# Patient Record
Sex: MALE | Race: WHITE | Employment: FULL TIME | URBAN - METROPOLITAN AREA
[De-identification: names, ages, dates, MRNs, and addresses within clinical notes are randomized per-mention and may not be internally consistent; named-entity substitution may affect disease eponyms.]

---

## 2017-09-16 ENCOUNTER — HOSPITAL ENCOUNTER (OUTPATIENT)
Dept: GENERAL RADIOLOGY | Age: 47
Discharge: HOME | End: 2017-09-16
Payer: COMMERCIAL

## 2017-09-16 DIAGNOSIS — S91.331A: ICD-10-CM

## 2017-09-16 PROCEDURE — 73630 X-RAY EXAM OF FOOT: CPT

## 2018-07-02 ENCOUNTER — OFFICE VISIT (OUTPATIENT)
Dept: FAMILY MEDICINE CLINIC | Age: 48
End: 2018-07-02
Payer: COMMERCIAL

## 2018-07-02 VITALS
RESPIRATION RATE: 12 BRPM | BODY MASS INDEX: 32.91 KG/M2 | DIASTOLIC BLOOD PRESSURE: 80 MMHG | WEIGHT: 256.44 LBS | HEIGHT: 74 IN | HEART RATE: 71 BPM | SYSTOLIC BLOOD PRESSURE: 126 MMHG | OXYGEN SATURATION: 96 % | TEMPERATURE: 97 F

## 2018-07-02 DIAGNOSIS — L02.92 BOIL: Primary | ICD-10-CM

## 2018-07-02 DIAGNOSIS — R21 RASH: ICD-10-CM

## 2018-07-02 DIAGNOSIS — B07.8 OTHER VIRAL WARTS: ICD-10-CM

## 2018-07-02 PROCEDURE — G8417 CALC BMI ABV UP PARAM F/U: HCPCS | Performed by: FAMILY MEDICINE

## 2018-07-02 PROCEDURE — 1036F TOBACCO NON-USER: CPT | Performed by: FAMILY MEDICINE

## 2018-07-02 PROCEDURE — G8427 DOCREV CUR MEDS BY ELIG CLIN: HCPCS | Performed by: FAMILY MEDICINE

## 2018-07-02 PROCEDURE — 17000 DESTRUCT PREMALG LESION: CPT | Performed by: FAMILY MEDICINE

## 2018-07-02 PROCEDURE — 99213 OFFICE O/P EST LOW 20 MIN: CPT | Performed by: FAMILY MEDICINE

## 2018-07-02 RX ORDER — CEPHALEXIN 500 MG/1
500 CAPSULE ORAL 3 TIMES DAILY
Qty: 30 CAPSULE | Refills: 0 | Status: SHIPPED | OUTPATIENT
Start: 2018-07-02 | End: 2018-07-12

## 2018-07-02 ASSESSMENT — PATIENT HEALTH QUESTIONNAIRE - PHQ9
1. LITTLE INTEREST OR PLEASURE IN DOING THINGS: 0
SUM OF ALL RESPONSES TO PHQ QUESTIONS 1-9: 0
2. FEELING DOWN, DEPRESSED OR HOPELESS: 0
SUM OF ALL RESPONSES TO PHQ9 QUESTIONS 1 & 2: 0

## 2018-07-02 NOTE — PROGRESS NOTES
Narrative    No narrative on file     History reviewed. No pertinent family history. Past Medical History:   Diagnosis Date    Anxiety     Colitis     Depression     Dermatitis     Seasonal allergies      Past Surgical History:   Procedure Laterality Date    COLONOSCOPY  12/11/15    DR [facility]    COLONOSCOPY  12/08/2017    DR ARREGUIN         REVIEW OF SYSTEMS:   Patient seen today for exam.  Denies any problems with hearing, headaches or vision. Denies any shortness of breath, chest pain, nausea or vomiting. No black stool, no blood in the stool. No heartburn. Denies any problems with constipation or diarrhea either. No dysuria type symptoms. Objective:     /80 (Site: Left Arm, Position: Sitting, Cuff Size: Large Adult)   Pulse 71   Temp 97 °F (36.1 °C) (Temporal)   Resp 12   Ht 6' 2\" (1.88 m)   Wt 256 lb 7 oz (116.3 kg)   SpO2 96%   BMI 32.92 kg/m²     Physical Exam        O:  Alert and active male in no acute distress  HEENT:  TMs clear. Pharynx neg. Nares clear, no drainage noted  Neck supple/ no adenopathy /right cheek positive boil to centers by 1 cm not fluctuant tender to touch   HEART:  RRR without murmur/ no carotid bruits  LUNGS:  Clear to auscultation bilaterally, no wheeze or rhonchi noted  THYROID: neg masses or nodularity  ABDOMEN:  Soft x4. Bowel sounds positive. No masses or organomegaly,  Negative tenderness, guarding or rebound. EXTR:  Without edema./ good pulses bilat    Neurologic exam unremarkable. DTRs in upper and lower extremities within normal limits. Full strength noted    Skin- positive verruca noted 2 areas of his hands greater than 10 lesions noted visit were cleaned and then frozen with liquid I can refreeze technique taught tolerated procedure well no palpitations       Assessment:       Diagnosis Orders   1. Boil     2.  Rash     3. Other viral warts  40438 - [place] DESTRUC PREMALIGNANT, FIRST LESION             Plan:        Orders Placed This

## 2018-11-19 ENCOUNTER — OFFICE VISIT (OUTPATIENT)
Dept: FAMILY MEDICINE CLINIC | Age: 48
End: 2018-11-19
Payer: COMMERCIAL

## 2018-11-19 VITALS
SYSTOLIC BLOOD PRESSURE: 124 MMHG | WEIGHT: 259.1 LBS | BODY MASS INDEX: 33.27 KG/M2 | TEMPERATURE: 97 F | HEART RATE: 54 BPM | RESPIRATION RATE: 14 BRPM | DIASTOLIC BLOOD PRESSURE: 76 MMHG

## 2018-11-19 DIAGNOSIS — J06.9 UPPER RESPIRATORY TRACT INFECTION, UNSPECIFIED TYPE: Primary | ICD-10-CM

## 2018-11-19 DIAGNOSIS — M79.10 MYALGIA: ICD-10-CM

## 2018-11-19 LAB
INFLUENZA A ANTIBODY: NORMAL
INFLUENZA B ANTIBODY: NORMAL

## 2018-11-19 PROCEDURE — G8427 DOCREV CUR MEDS BY ELIG CLIN: HCPCS | Performed by: FAMILY MEDICINE

## 2018-11-19 PROCEDURE — G8417 CALC BMI ABV UP PARAM F/U: HCPCS | Performed by: FAMILY MEDICINE

## 2018-11-19 PROCEDURE — 99213 OFFICE O/P EST LOW 20 MIN: CPT | Performed by: FAMILY MEDICINE

## 2018-11-19 PROCEDURE — 87804 INFLUENZA ASSAY W/OPTIC: CPT | Performed by: FAMILY MEDICINE

## 2018-11-19 PROCEDURE — G8484 FLU IMMUNIZE NO ADMIN: HCPCS | Performed by: FAMILY MEDICINE

## 2018-11-19 PROCEDURE — 1036F TOBACCO NON-USER: CPT | Performed by: FAMILY MEDICINE

## 2018-11-19 RX ORDER — AMOXICILLIN 875 MG/1
875 TABLET, COATED ORAL 2 TIMES DAILY
Qty: 20 TABLET | Refills: 0 | Status: SHIPPED | OUTPATIENT
Start: 2018-11-19 | End: 2018-11-29

## 2019-04-19 ENCOUNTER — TELEPHONE (OUTPATIENT)
Dept: FAMILY MEDICINE CLINIC | Age: 49
End: 2019-04-19

## 2023-02-05 PROBLEM — Z86.010 HISTORY OF COLON POLYPS: Status: ACTIVE | Noted: 2023-02-05

## 2023-02-05 PROBLEM — K51.90 ULCERATIVE COLITIS (MULTI): Status: ACTIVE | Noted: 2023-02-05

## 2023-02-05 PROBLEM — R73.01 IFG (IMPAIRED FASTING GLUCOSE): Status: ACTIVE | Noted: 2023-02-05

## 2023-02-05 PROBLEM — D64.9 ANEMIA: Status: ACTIVE | Noted: 2023-02-05

## 2023-02-05 PROBLEM — E55.9 VITAMIN D DEFICIENCY: Status: ACTIVE | Noted: 2023-02-05

## 2023-02-05 PROBLEM — K64.0 GRADE I HEMORRHOIDS: Status: ACTIVE | Noted: 2023-02-05

## 2023-02-05 PROBLEM — K03.81 CRACKED TOOTH: Status: ACTIVE | Noted: 2023-02-05

## 2023-02-05 PROBLEM — J98.8 RESPIRATORY INFECTION: Status: ACTIVE | Noted: 2023-02-05

## 2023-02-05 PROBLEM — Z86.16 HISTORY OF COVID-19: Status: ACTIVE | Noted: 2023-02-05

## 2023-02-05 PROBLEM — Z86.0100 HISTORY OF COLON POLYPS: Status: ACTIVE | Noted: 2023-02-05

## 2023-02-05 RX ORDER — LANOLIN ALCOHOL/MO/W.PET/CERES
1 CREAM (GRAM) TOPICAL DAILY
COMMUNITY

## 2023-02-05 RX ORDER — MERCAPTOPURINE 50 MG/1
2 TABLET ORAL DAILY
COMMUNITY

## 2023-02-05 RX ORDER — AMOXICILLIN AND CLAVULANATE POTASSIUM 875; 125 MG/1; MG/1
1 TABLET, FILM COATED ORAL EVERY 12 HOURS
COMMUNITY
End: 2023-03-24 | Stop reason: ALTCHOICE

## 2023-02-05 RX ORDER — CALCIUM CARBONATE/VITAMIN D3 600 MG-10
1 TABLET ORAL DAILY
COMMUNITY

## 2023-02-05 RX ORDER — INFLIXIMAB 100 MG/10ML
INJECTION, POWDER, LYOPHILIZED, FOR SOLUTION INTRAVENOUS
COMMUNITY

## 2023-02-05 RX ORDER — ACETAMINOPHEN 500 MG
1 TABLET ORAL DAILY
COMMUNITY
End: 2023-03-24 | Stop reason: SDUPTHER

## 2023-03-18 PROBLEM — K03.81 CRACKED TOOTH: Status: RESOLVED | Noted: 2023-02-05 | Resolved: 2023-03-18

## 2023-03-18 PROBLEM — Z00.00 ROUTINE ADULT HEALTH MAINTENANCE: Chronic | Status: ACTIVE | Noted: 2023-03-18

## 2023-03-18 PROBLEM — J98.8 RESPIRATORY INFECTION: Status: RESOLVED | Noted: 2023-02-05 | Resolved: 2023-03-18

## 2023-03-23 LAB
ALANINE AMINOTRANSFERASE (SGPT) (U/L) IN SER/PLAS: 30 U/L (ref 10–52)
ALBUMIN (G/DL) IN SER/PLAS: 4 G/DL (ref 3.4–5)
ALKALINE PHOSPHATASE (U/L) IN SER/PLAS: 50 U/L (ref 33–120)
ANION GAP IN SER/PLAS: 10 MMOL/L (ref 10–20)
APPEARANCE, URINE: CLEAR
ASPARTATE AMINOTRANSFERASE (SGOT) (U/L) IN SER/PLAS: 25 U/L (ref 9–39)
BASOPHILS (10*3/UL) IN BLOOD BY AUTOMATED COUNT: 0.05 X10E9/L (ref 0–0.1)
BASOPHILS/100 LEUKOCYTES IN BLOOD BY AUTOMATED COUNT: 0.8 % (ref 0–2)
BILIRUBIN TOTAL (MG/DL) IN SER/PLAS: 0.5 MG/DL (ref 0–1.2)
BILIRUBIN, URINE: NEGATIVE
BLOOD, URINE: NEGATIVE
CALCIDIOL (25 OH VITAMIN D3) (NG/ML) IN SER/PLAS: 29 NG/ML
CALCIUM (MG/DL) IN SER/PLAS: 8.9 MG/DL (ref 8.6–10.3)
CARBON DIOXIDE, TOTAL (MMOL/L) IN SER/PLAS: 28 MMOL/L (ref 21–32)
CHLORIDE (MMOL/L) IN SER/PLAS: 103 MMOL/L (ref 98–107)
CHOLESTEROL (MG/DL) IN SER/PLAS: 211 MG/DL (ref 0–199)
CHOLESTEROL IN HDL (MG/DL) IN SER/PLAS: 41.6 MG/DL
CHOLESTEROL/HDL RATIO: 5.1
COLOR, URINE: YELLOW
CREATININE (MG/DL) IN SER/PLAS: 0.95 MG/DL (ref 0.5–1.3)
EOSINOPHILS (10*3/UL) IN BLOOD BY AUTOMATED COUNT: 0.29 X10E9/L (ref 0–0.7)
EOSINOPHILS/100 LEUKOCYTES IN BLOOD BY AUTOMATED COUNT: 4.6 % (ref 0–6)
ERYTHROCYTE DISTRIBUTION WIDTH (RATIO) BY AUTOMATED COUNT: 12.4 % (ref 11.5–14.5)
ERYTHROCYTE MEAN CORPUSCULAR HEMOGLOBIN CONCENTRATION (G/DL) BY AUTOMATED: 34.1 G/DL (ref 32–36)
ERYTHROCYTE MEAN CORPUSCULAR VOLUME (FL) BY AUTOMATED COUNT: 91 FL (ref 80–100)
ERYTHROCYTES (10*6/UL) IN BLOOD BY AUTOMATED COUNT: 5 X10E12/L (ref 4.5–5.9)
GFR MALE: >90 ML/MIN/1.73M2
GLUCOSE (MG/DL) IN SER/PLAS: 89 MG/DL (ref 74–99)
GLUCOSE, URINE: NEGATIVE MG/DL
HEMATOCRIT (%) IN BLOOD BY AUTOMATED COUNT: 45.5 % (ref 41–52)
HEMOGLOBIN (G/DL) IN BLOOD: 15.5 G/DL (ref 13.5–17.5)
IMMATURE GRANULOCYTES/100 LEUKOCYTES IN BLOOD BY AUTOMATED COUNT: 0.2 % (ref 0–0.9)
KETONES, URINE: NEGATIVE MG/DL
LDL: 143 MG/DL (ref 0–99)
LEUKOCYTE ESTERASE, URINE: NEGATIVE
LEUKOCYTES (10*3/UL) IN BLOOD BY AUTOMATED COUNT: 6.4 X10E9/L (ref 4.4–11.3)
LYMPHOCYTES (10*3/UL) IN BLOOD BY AUTOMATED COUNT: 2.72 X10E9/L (ref 1.2–4.8)
LYMPHOCYTES/100 LEUKOCYTES IN BLOOD BY AUTOMATED COUNT: 42.8 % (ref 13–44)
MONOCYTES (10*3/UL) IN BLOOD BY AUTOMATED COUNT: 0.49 X10E9/L (ref 0.1–1)
MONOCYTES/100 LEUKOCYTES IN BLOOD BY AUTOMATED COUNT: 7.7 % (ref 2–10)
NEUTROPHILS (10*3/UL) IN BLOOD BY AUTOMATED COUNT: 2.79 X10E9/L (ref 1.2–7.7)
NEUTROPHILS/100 LEUKOCYTES IN BLOOD BY AUTOMATED COUNT: 43.9 % (ref 40–80)
NITRITE, URINE: NEGATIVE
PH, URINE: 5 (ref 5–8)
PLATELETS (10*3/UL) IN BLOOD AUTOMATED COUNT: 270 X10E9/L (ref 150–450)
POTASSIUM (MMOL/L) IN SER/PLAS: 4.2 MMOL/L (ref 3.5–5.3)
PROTEIN TOTAL: 7.6 G/DL (ref 6.4–8.2)
PROTEIN, URINE: NEGATIVE MG/DL
SODIUM (MMOL/L) IN SER/PLAS: 137 MMOL/L (ref 136–145)
SPECIFIC GRAVITY, URINE: 1.02 (ref 1–1.03)
THYROTROPIN (MIU/L) IN SER/PLAS BY DETECTION LIMIT <= 0.05 MIU/L: 2.5 MIU/L (ref 0.44–3.98)
TRIGLYCERIDE (MG/DL) IN SER/PLAS: 130 MG/DL (ref 0–149)
UREA NITROGEN (MG/DL) IN SER/PLAS: 16 MG/DL (ref 6–23)
UROBILINOGEN, URINE: <2 MG/DL (ref 0–1.9)
VLDL: 26 MG/DL (ref 0–40)

## 2023-03-24 ENCOUNTER — APPOINTMENT (OUTPATIENT)
Dept: LAB | Facility: LAB | Age: 53
End: 2023-03-24
Payer: COMMERCIAL

## 2023-03-24 ENCOUNTER — OFFICE VISIT (OUTPATIENT)
Dept: PRIMARY CARE | Facility: CLINIC | Age: 53
End: 2023-03-24
Payer: COMMERCIAL

## 2023-03-24 VITALS
WEIGHT: 270 LBS | OXYGEN SATURATION: 97 % | DIASTOLIC BLOOD PRESSURE: 81 MMHG | SYSTOLIC BLOOD PRESSURE: 127 MMHG | HEIGHT: 73 IN | HEART RATE: 63 BPM | BODY MASS INDEX: 35.78 KG/M2

## 2023-03-24 DIAGNOSIS — R73.01 IFG (IMPAIRED FASTING GLUCOSE): ICD-10-CM

## 2023-03-24 DIAGNOSIS — Z86.19 H/O HERPES ZOSTER: Primary | ICD-10-CM

## 2023-03-24 DIAGNOSIS — E66.01 CLASS 2 SEVERE OBESITY DUE TO EXCESS CALORIES WITH SERIOUS COMORBIDITY AND BODY MASS INDEX (BMI) OF 35.0 TO 35.9 IN ADULT (MULTI): ICD-10-CM

## 2023-03-24 DIAGNOSIS — Z13.6 SCREENING FOR CARDIOVASCULAR CONDITION: ICD-10-CM

## 2023-03-24 DIAGNOSIS — E78.2 HYPERLIPIDEMIA, MIXED: ICD-10-CM

## 2023-03-24 DIAGNOSIS — Z00.00 ROUTINE ADULT HEALTH MAINTENANCE: Chronic | ICD-10-CM

## 2023-03-24 DIAGNOSIS — K51.90 ULCERATIVE COLITIS WITHOUT COMPLICATIONS, UNSPECIFIED LOCATION (MULTI): ICD-10-CM

## 2023-03-24 DIAGNOSIS — E55.9 VITAMIN D DEFICIENCY: ICD-10-CM

## 2023-03-24 DIAGNOSIS — Z12.5 SCREENING FOR PROSTATE CANCER: ICD-10-CM

## 2023-03-24 PROBLEM — E66.812 CLASS 2 SEVERE OBESITY DUE TO EXCESS CALORIES WITH SERIOUS COMORBIDITY AND BODY MASS INDEX (BMI) OF 35.0 TO 35.9 IN ADULT: Status: ACTIVE | Noted: 2023-03-24

## 2023-03-24 PROCEDURE — 3008F BODY MASS INDEX DOCD: CPT | Performed by: INTERNAL MEDICINE

## 2023-03-24 PROCEDURE — 1036F TOBACCO NON-USER: CPT | Performed by: INTERNAL MEDICINE

## 2023-03-24 PROCEDURE — 99396 PREV VISIT EST AGE 40-64: CPT | Performed by: INTERNAL MEDICINE

## 2023-03-24 RX ORDER — ACETAMINOPHEN 500 MG
100 TABLET ORAL DAILY
Qty: 1 CAPSULE | Refills: 1
Start: 2023-03-24

## 2023-03-24 RX ORDER — ASCORBIC ACID 500 MG
500 TABLET ORAL 2 TIMES DAILY
COMMUNITY

## 2023-03-24 ASSESSMENT — PATIENT HEALTH QUESTIONNAIRE - PHQ9
2. FEELING DOWN, DEPRESSED OR HOPELESS: NOT AT ALL
SUM OF ALL RESPONSES TO PHQ9 QUESTIONS 1 AND 2: 0
1. LITTLE INTEREST OR PLEASURE IN DOING THINGS: NOT AT ALL

## 2023-03-24 NOTE — ASSESSMENT & PLAN NOTE
Annual Wellness exam completed   Preventive Health history reviewed:  Vaccines today: Shingles vaccine discussed  Labs ordered    Cscope ordered    Depression Screening done

## 2023-03-24 NOTE — PROGRESS NOTES
Reason for Visit: Annual Physical Exam    Assessment and Plan:  Problem List Items Addressed This Visit          High    Routine adult health maintenance (Chronic)    Overview     Declined COVID and FLU Vaccines  Cscope 2/17/21 (2yrs)  TDAP 6/28/13, 9/16/17         Current Assessment & Plan     Annual Wellness exam completed   Preventive Health history reviewed:  Vaccines today: Shingles vaccine discussed  Labs ordered    Cscope ordered    Depression Screening done         Relevant Orders    Urinalysis with Reflex Microscopic    Comprehensive Metabolic Panel    CBC and Auto Differential    Lipid Panel    Referral to Ophthalmology       Medium    IFG (impaired fasting glucose)    Overview     Diet controlled  Hba1c 5.9% in 2/22         Relevant Orders    Hemoglobin A1c    Ulcerative colitis (CMS/HCC)    Overview     Dx in 2001  in clinical remission on Remicade and 6 MP 50 mg BID  Managed By GI, Dr Seals         Current Assessment & Plan     Cscope due         Vitamin D deficiency    Overview     2/3/22: 28  on supp  Goal ~50         Current Assessment & Plan     Increase dose to 2/day         Relevant Medications    cholecalciferol (Vitamin D-3) 50 mcg (2,000 unit) capsule    Other Relevant Orders    Vitamin D, Total    H/O herpes zoster - Primary    Hyperlipidemia, mixed    Relevant Orders    CT cardiac scoring wo IV contrast    TSH with reflex to Free T4 if abnormal    Class 2 severe obesity due to excess calories with serious comorbidity and body mass index (BMI) of 35.0 to 35.9 in adult (CMS/[facility])    Overview     Discussed IF  Will change whole milk to 2%  Cut back on butter         Screening for prostate cancer    Relevant Orders    Prostate Spec.Ag,Screen    Prostate Spec.Ag,Screen     Other Visit Diagnoses       Screening for cardiovascular condition        Relevant Orders    CT cardiac scoring wo IV contrast          HPI: denies any concerns today  Lasbs reviewed:  Component      Latest Ref Rng 3/23/2023    WBC      4.4 - 11.3 x10E9/L 6.4    RBC      4.50 - 5.90 x10E12/L 5.00    HEMOGLOBIN      13.5 - 17.5 g/dL 15.5    HEMATOCRIT      41.0 - 52.0 % 45.5    MCV      80 - 100 fL 91    MCHC      32.0 - 36.0 g/dL 34.1    Platelets      150 - 450 x10E9/L 270    RED CELL DISTRIBUTION WIDTH      11.5 - 14.5 % 12.4    Neutrophils %      40.0 - 80.0 % 43.9    Immature Granulocytes %, Automated      0.0 - 0.9 % 0.2    Lymphocytes %      13.0 - 44.0 % 42.8    Monocytes %      2.0 - 10.0 % 7.7    Eosinophils %      0.0 - 6.0 % 4.6    Basophils %      0.0 - 2.0 % 0.8    Neutrophils Absolute      1.20 - 7.70 x10E9/L 2.79    Lymphocytes Absolute      1.20 - 4.80 x10E9/L 2.72    Monocytes Absolute      0.10 - 1.00 x10E9/L 0.49    Eosinophils Absolute      0.00 - 0.70 x10E9/L 0.29    Basophils Absolute      0.00 - 0.10 x10E9/L 0.05    GLUCOSE      74 - 99 mg/dL 89    SODIUM      136 - 145 mmol/L 137    POTASSIUM      3.5 - 5.3 mmol/L 4.2    CHLORIDE      98 - 107 mmol/L 103    Bicarbonate      21 - 32 mmol/L 28    Anion Gap      10 - 20 mmol/L 10    Blood Urea Nitrogen      6 - 23 mg/dL 16    Creatinine      0.50 - 1.30 mg/dL 0.95    GFR MALE      >90 mL/min/1.73m2 >90    Calcium      8.6 - 10.3 mg/dL 8.9    Albumin      3.4 - 5.0 g/dL 4.0    Alkaline Phosphatase      33 - 120 U/L 50    Total Protein      6.4 - 8.2 g/dL 7.6    AST      9 - 39 U/L 25    Bilirubin Total      0.0 - 1.2 mg/dL 0.5    ALT      10 - 52 U/L 30    Color, Urine      STRAW,YELLOW  YELLOW    Appearance, Urine      CLEAR  CLEAR    Specific Gravity, Urine      1.005 - 1.035  1.020    pH, Urine      5.0 - 8.0  5.0    Protein, Urine      NEGATIVE mg/dL NEGATIVE    Glucose, Urine      NEGATIVE mg/dL NEGATIVE    Blood, Urine      NEGATIVE  NEGATIVE    Ketones, Urine      NEGATIVE mg/dL NEGATIVE    Bilirubin, Urine      NEGATIVE  NEGATIVE    Urobilinogen, Urine      0.0 - 1.9 mg/dL <2.0    Nitrite, Urine      NEGATIVE  NEGATIVE    Leukocyte Esterase, Urine       "NEGATIVE  NEGATIVE    CHOLESTEROL      0 - 199 mg/dL 211 (H)    HDL CHOLESTEROL      mg/dL 41.6    Cholesterol/HDL Ratio 5.1 !    LDL      0 - 99 mg/dL 143 (H)    VLDL      0 - 40 mg/dL 26    TRIGLYCERIDES      0 - 149 mg/dL 130    Vitamin D, 25-Hydroxy, Total      ng/mL 29 !    Thyroid Stimulating Hormone      0.44 - 3.98 mIU/L 2.50        ROS otherwise negative aside from what was mentioned above in HPI.    Vitals  /81   Pulse 63   Ht 1.854 m (6' 1\")   Wt 122 kg (270 lb)   SpO2 97%   BMI 35.62 kg/m²   Body mass index is 35.62 kg/m².  Physical Exam  Gen: Alert, NAD  HEENT:  Normal exam  Neck:  No masses/nodes palpable; Thyroid nontender and without nodules; No SHELLI  Respiratory:  Lungs CTAB  CV: RRR  Neuro:  Gross motor and sensory intact  Skin:  No suspicious lesions present  Breast: No masses or axillary lymphadenopathy  ELBERT: Prostate not enlarged. No prostate mass or nodule(s) palpated, brown stool. (Pt declined chaperone)    Active Problem List  Patient Active Problem List   Diagnosis    Anemia    Grade I hemorrhoids    History of colon polyps    History of COVID-19    IFG (impaired fasting glucose)    Ulcerative colitis (CMS/[facility])    Vitamin D deficiency    Routine adult health maintenance    H/O herpes zoster    Hyperlipidemia, mixed    Class 2 severe obesity due to excess calories with serious comorbidity and body mass index (BMI) of 35.0 to 35.9 in adult (CMS/[facility])    Screening for prostate cancer       Comprehensive Medical/Surgical/Social/Family History  Past Medical History:   Diagnosis Date    H/O chest x-ray 01/14/2022    Some improvement in continued bilateral large subpleural infiltrates, left greater than right (COVID)    H/O CT scan of chest 12/29/2021    No CT evidence of pulmonary embolism. 2. Extensive bilateral groundglass interstitial infiltrate without interval improvement with persistent mild reactive hilar and mediastinal adenopathy.    H/O echocardiogram 01/15/2022    The left " ventricle is normal in size. Left ventricular systolic function is hyperdynamic. EF = 79 5% (2D biplane) Normal left ventricular diastolic function.  The right ventricle is normal in size. Right ventricular systolic function is normal.  Estimated right ventricular systolic pressure is not reported due to an insufficient tricuspid regurgitation signal.    H/O echocardiogram 01/15/2022    Pt 2. Estimated right atrial pressure is 3 mmHg based on IVC assessment. No significant valvular abnormalities.     Past Surgical History:   Procedure Laterality Date    COLONOSCOPY      Pseudopolyps in the ascending colon. Resected and retrieved.  Two diminutive polyps in the sigmoid colon, removed with a jumbo cold forceps. Resected and retrieved.  Four diminutive polyps in the rectum, removed with a jumbo cold forceps. Resected and retrieved.  Decreased mucosa vascular pattern in the rectum and in the sigmoid colon.  Internal hemorrhoids     Social History     Social History Narrative    1 child    [facility]    Nonsmoker    Occasional ETOH    ---    Family History:    M:    F: Cancer (agent orange related?) ( age 60)    GM: CAD, PVD ()    GM:COPD, DM ( age 97)    GF: {Prostate CA ( age 67)    B: Asthma    2B:    :    :       Allergies and Medications  Patient has no known allergies.  Current Outpatient Medications   Medication Instructions    ascorbic acid (VITAMIN C) 500 mg, oral, 2 times daily    cholecalciferol (VITAMIN D-3) 100 mcg, oral, Daily    cyanocobalamin (Vitamin B-12) 1,000 mcg tablet 1 tablet, oral, Daily    inFLIXimab (Remicade) 100 mg injection intravenous, Use as directed    L. rhamnosus/C/zinc/elderberry (CULTURELLE IMMUNE DEFENSE ORAL) 2 tablets, oral, Daily    Lactobacillus acidophilus (PROBIOTIC ORAL) 1 capsule, oral, Daily    mercaptopurine (Purinethol) 50 mg tablet 2 tablets, oral, Daily    NON FORMULARY Daily, Wheat Grass    omega-3 fatty acids-fish oil (One-Per-Day  Omega-3) 684-1,200 mg capsule 2 capsules, oral, Daily    TURMERIC ORAL 1 capsule, oral, Daily, Turmeric Curcumin Oral Capsule

## 2023-04-03 PROBLEM — R93.1 ABNORMAL SCREENING CARDIAC CT: Status: ACTIVE | Noted: 2023-04-03

## 2023-05-09 ENCOUNTER — LAB (OUTPATIENT)
Dept: LAB | Facility: LAB | Age: 53
End: 2023-05-09
Payer: COMMERCIAL

## 2023-05-09 DIAGNOSIS — E55.9 VITAMIN D DEFICIENCY: ICD-10-CM

## 2023-05-09 DIAGNOSIS — Z00.00 ROUTINE ADULT HEALTH MAINTENANCE: Chronic | ICD-10-CM

## 2023-05-09 DIAGNOSIS — Z12.5 SCREENING FOR PROSTATE CANCER: ICD-10-CM

## 2023-05-09 DIAGNOSIS — E78.2 HYPERLIPIDEMIA, MIXED: ICD-10-CM

## 2023-05-09 DIAGNOSIS — R73.01 IFG (IMPAIRED FASTING GLUCOSE): ICD-10-CM

## 2023-05-09 LAB
ALANINE AMINOTRANSFERASE (SGPT) (U/L) IN SER/PLAS: 19 U/L (ref 10–52)
ALBUMIN (G/DL) IN SER/PLAS: 4 G/DL (ref 3.4–5)
ALKALINE PHOSPHATASE (U/L) IN SER/PLAS: 49 U/L (ref 33–120)
ANION GAP IN SER/PLAS: 10 MMOL/L (ref 10–20)
APPEARANCE, URINE: CLEAR
ASPARTATE AMINOTRANSFERASE (SGOT) (U/L) IN SER/PLAS: 17 U/L (ref 9–39)
BASOPHILS (10*3/UL) IN BLOOD BY AUTOMATED COUNT: 0.04 X10E9/L (ref 0–0.1)
BASOPHILS/100 LEUKOCYTES IN BLOOD BY AUTOMATED COUNT: 0.7 % (ref 0–2)
BILIRUBIN TOTAL (MG/DL) IN SER/PLAS: 0.5 MG/DL (ref 0–1.2)
BILIRUBIN, URINE: NEGATIVE
BLOOD, URINE: NEGATIVE
CALCIDIOL (25 OH VITAMIN D3) (NG/ML) IN SER/PLAS: 32 NG/ML
CALCIUM (MG/DL) IN SER/PLAS: 8.6 MG/DL (ref 8.6–10.3)
CARBON DIOXIDE, TOTAL (MMOL/L) IN SER/PLAS: 28 MMOL/L (ref 21–32)
CHLORIDE (MMOL/L) IN SER/PLAS: 105 MMOL/L (ref 98–107)
CHOLESTEROL (MG/DL) IN SER/PLAS: 206 MG/DL (ref 0–199)
CHOLESTEROL IN HDL (MG/DL) IN SER/PLAS: 39.3 MG/DL
CHOLESTEROL/HDL RATIO: 5.2
COLOR, URINE: YELLOW
CREATININE (MG/DL) IN SER/PLAS: 1.1 MG/DL (ref 0.5–1.3)
EOSINOPHILS (10*3/UL) IN BLOOD BY AUTOMATED COUNT: 0.27 X10E9/L (ref 0–0.7)
EOSINOPHILS/100 LEUKOCYTES IN BLOOD BY AUTOMATED COUNT: 4.9 % (ref 0–6)
ERYTHROCYTE DISTRIBUTION WIDTH (RATIO) BY AUTOMATED COUNT: 12 % (ref 11.5–14.5)
ERYTHROCYTE MEAN CORPUSCULAR HEMOGLOBIN CONCENTRATION (G/DL) BY AUTOMATED: 33.9 G/DL (ref 32–36)
ERYTHROCYTE MEAN CORPUSCULAR VOLUME (FL) BY AUTOMATED COUNT: 90 FL (ref 80–100)
ERYTHROCYTES (10*6/UL) IN BLOOD BY AUTOMATED COUNT: 4.88 X10E12/L (ref 4.5–5.9)
ESTIMATED AVERAGE GLUCOSE FOR HBA1C: 103 MG/DL
GFR MALE: 82 ML/MIN/1.73M2
GLUCOSE (MG/DL) IN SER/PLAS: 93 MG/DL (ref 74–99)
GLUCOSE, URINE: NEGATIVE MG/DL
HEMATOCRIT (%) IN BLOOD BY AUTOMATED COUNT: 43.9 % (ref 41–52)
HEMOGLOBIN (G/DL) IN BLOOD: 14.9 G/DL (ref 13.5–17.5)
HEMOGLOBIN A1C/HEMOGLOBIN TOTAL IN BLOOD: 5.2 %
IMMATURE GRANULOCYTES/100 LEUKOCYTES IN BLOOD BY AUTOMATED COUNT: 0.2 % (ref 0–0.9)
KETONES, URINE: NEGATIVE MG/DL
LDL: 138 MG/DL (ref 0–99)
LEUKOCYTE ESTERASE, URINE: NEGATIVE
LEUKOCYTES (10*3/UL) IN BLOOD BY AUTOMATED COUNT: 5.6 X10E9/L (ref 4.4–11.3)
LYMPHOCYTES (10*3/UL) IN BLOOD BY AUTOMATED COUNT: 2.52 X10E9/L (ref 1.2–4.8)
LYMPHOCYTES/100 LEUKOCYTES IN BLOOD BY AUTOMATED COUNT: 45.3 % (ref 13–44)
MONOCYTES (10*3/UL) IN BLOOD BY AUTOMATED COUNT: 0.48 X10E9/L (ref 0.1–1)
MONOCYTES/100 LEUKOCYTES IN BLOOD BY AUTOMATED COUNT: 8.6 % (ref 2–10)
NEUTROPHILS (10*3/UL) IN BLOOD BY AUTOMATED COUNT: 2.24 X10E9/L (ref 1.2–7.7)
NEUTROPHILS/100 LEUKOCYTES IN BLOOD BY AUTOMATED COUNT: 40.3 % (ref 40–80)
NITRITE, URINE: NEGATIVE
PH, URINE: 5 (ref 5–8)
PLATELETS (10*3/UL) IN BLOOD AUTOMATED COUNT: 260 X10E9/L (ref 150–450)
POTASSIUM (MMOL/L) IN SER/PLAS: 4.2 MMOL/L (ref 3.5–5.3)
PROSTATE SPECIFIC ANTIGEN,SCREEN: 0.93 NG/ML (ref 0–4)
PROTEIN TOTAL: 7.1 G/DL (ref 6.4–8.2)
PROTEIN, URINE: NEGATIVE MG/DL
SODIUM (MMOL/L) IN SER/PLAS: 139 MMOL/L (ref 136–145)
SPECIFIC GRAVITY, URINE: 1.02 (ref 1–1.03)
THYROTROPIN (MIU/L) IN SER/PLAS BY DETECTION LIMIT <= 0.05 MIU/L: 2.53 MIU/L (ref 0.44–3.98)
TRIGLYCERIDE (MG/DL) IN SER/PLAS: 142 MG/DL (ref 0–149)
UREA NITROGEN (MG/DL) IN SER/PLAS: 13 MG/DL (ref 6–23)
UROBILINOGEN, URINE: <2 MG/DL (ref 0–1.9)
VLDL: 28 MG/DL (ref 0–40)

## 2023-05-09 PROCEDURE — 36415 COLL VENOUS BLD VENIPUNCTURE: CPT

## 2023-05-09 PROCEDURE — 82306 VITAMIN D 25 HYDROXY: CPT

## 2023-05-09 PROCEDURE — 80053 COMPREHEN METABOLIC PANEL: CPT

## 2023-05-09 PROCEDURE — 85025 COMPLETE CBC W/AUTO DIFF WBC: CPT

## 2023-05-09 PROCEDURE — 80061 LIPID PANEL: CPT

## 2023-05-09 PROCEDURE — 83036 HEMOGLOBIN GLYCOSYLATED A1C: CPT

## 2023-05-09 PROCEDURE — 81003 URINALYSIS AUTO W/O SCOPE: CPT

## 2023-05-09 PROCEDURE — 84153 ASSAY OF PSA TOTAL: CPT

## 2023-05-09 PROCEDURE — 84443 ASSAY THYROID STIM HORMONE: CPT

## 2023-12-18 ENCOUNTER — TELEPHONE (OUTPATIENT)
Dept: PRIMARY CARE | Facility: CLINIC | Age: 53
End: 2023-12-18

## 2023-12-18 ENCOUNTER — TELEMEDICINE (OUTPATIENT)
Dept: PRIMARY CARE | Facility: CLINIC | Age: 53
End: 2023-12-18
Payer: COMMERCIAL

## 2023-12-18 DIAGNOSIS — M54.12 CERVICAL RADICULOPATHY: Primary | ICD-10-CM

## 2023-12-18 PROCEDURE — 99213 OFFICE O/P EST LOW 20 MIN: CPT | Performed by: INTERNAL MEDICINE

## 2023-12-18 RX ORDER — METHYLPREDNISOLONE 4 MG/1
TABLET ORAL
Qty: 21 TABLET | Refills: 0 | Status: SHIPPED | OUTPATIENT
Start: 2023-12-18 | End: 2023-12-25

## 2023-12-18 ASSESSMENT — PATIENT HEALTH QUESTIONNAIRE - PHQ9
2. FEELING DOWN, DEPRESSED OR HOPELESS: NOT AT ALL
1. LITTLE INTEREST OR PLEASURE IN DOING THINGS: NOT AT ALL
SUM OF ALL RESPONSES TO PHQ9 QUESTIONS 1 AND 2: 0

## 2023-12-18 NOTE — TELEPHONE ENCOUNTER
Pt called had a kink in his neck yesterday saw Dr Laws today he took xrays and sounded like he used a tens unit on him he is having shooting pain down the neck through the right tricep 9-10 pain spoke to his massage therapist they suggested asking pcp for a steroid to calm it down  Please advise

## 2023-12-18 NOTE — PROGRESS NOTES
An interactive telecommunication system which permits real time communications between the patient (at the originating site) and provider (at the distant site) was utilized to provide this telehealth service.    Verbal consent was requested and obtained from the patient for this telehealth visit.    We have confirmed the patient wishes to see me, Dr. Michelle Michaud, a board certified Internal Medicine physician with an active [place] medical license as well as verified the patient's identity and physical location in [place].  Audio and Visual both.    CC/HPI:   C/o Neck pain, asking for steroids  Woke up with kink in neck Sunday AM   Went to Dr Veto newton today had xrays done and tens unit  Seeing him tomorrow also to review results  Shooting pain fron neck down to right tricep  Worse and worse all day  Using NSAIDs w/o help  Right side and radiating into his right arm, tricep region  Didn't sleep due to the pain,So much pain, tears in eyes at times  No injruy and no trauma, no unusual activities  New pillow 2months ago and no issues until now  No rash  Hx shingles on face left side in past    Active Problem List  Patient Active Problem List   Diagnosis    Anemia    Grade I hemorrhoids    History of colon polyps    History of COVID-19    IFG (impaired fasting glucose)    Ulcerative colitis (CMS/[facility])    Vitamin D deficiency    Routine adult health maintenance    H/O herpes zoster    Hyperlipidemia, mixed    Class 2 severe obesity due to excess calories with serious comorbidity and body mass index (BMI) of 35.0 to 35.9 in adult (CMS/[facility])    Screening for prostate cancer    Abnormal screening cardiac CT       Allergies and Medications  Patient has no known allergies.  Current Outpatient Medications   Medication Instructions    ascorbic acid (VITAMIN C) 500 mg, oral, 2 times daily    cholecalciferol (VITAMIN D-3) 100 mcg, oral, Daily    cyanocobalamin (Vitamin B-12) 1,000 mcg tablet 1 tablet, oral, Daily    inFLIXimab  (Remicade) 100 mg injection intravenous, Use as directed    L. rhamnosus/C/zinc/elderberry (CULTURELLE IMMUNE DEFENSE ORAL) 2 tablets, oral, Daily    Lactobacillus acidophilus (PROBIOTIC ORAL) 1 capsule, oral, Daily    mercaptopurine (Purinethol) 50 mg tablet 2 tablets, oral, Daily    methylPREDNISolone (Medrol Dospak) 4 mg tablets Take as directed on package.    NON FORMULARY Daily, Wheat Grass    omega-3 fatty acids-fish oil (One-Per-Day Omega-3) 684-1,200 mg capsule 1 capsule, oral, Daily    TURMERIC ORAL 3 capsules, oral, Daily, Turmeric Curcumin Oral Capsule       ROS otherwise negative aside from what was mentioned above in HPI.    Vitals  Physical Exam  Gen: Alert, in mild distress with pain        Assessment and Plan:  Problem List Items Addressed This Visit    None  Visit Diagnoses       Cervical radiculopathy    -  Primary    Await xray results from Chiro  Empiric medrol and Percocet for pain  If sx persist will get MRI cervical spine and try Gabpentin    Relevant Medications    methylPREDNISolone (Medrol Dospak) 4 mg tablets    Other Relevant Orders    MR cervical spine wo IV contrast

## 2023-12-19 RX ORDER — OXYCODONE AND ACETAMINOPHEN 5; 325 MG/1; MG/1
1 TABLET ORAL EVERY 6 HOURS PRN
Qty: 28 TABLET | Refills: 0 | Status: SHIPPED | OUTPATIENT
Start: 2023-12-19 | End: 2023-12-22 | Stop reason: SDUPTHER

## 2023-12-19 NOTE — TELEPHONE ENCOUNTER
Pt calling states pain meds weren't called in yesterday.     Reviewed note looks like should be percocet? Please advise

## 2023-12-19 NOTE — TELEPHONE ENCOUNTER
Relayed to patient   Verbally understands   Patient states he just left the chiropractor  He has pinched nerve 4-6 disc   Right arm was getting tingling and numb ( FYI )     Appreciates that you sent in rx!

## 2023-12-21 NOTE — TELEPHONE ENCOUNTER
Spoke with Pt  Pt stated he is unable to get into to the MRI at  facility until 1/6. Pt is calling [facility] to see if they can get him in earlier  Please advise

## 2023-12-22 ENCOUNTER — OFFICE VISIT (OUTPATIENT)
Dept: PRIMARY CARE | Facility: CLINIC | Age: 53
End: 2023-12-22
Payer: COMMERCIAL

## 2023-12-22 VITALS
SYSTOLIC BLOOD PRESSURE: 149 MMHG | BODY MASS INDEX: 34.96 KG/M2 | HEART RATE: 53 BPM | DIASTOLIC BLOOD PRESSURE: 89 MMHG | WEIGHT: 265 LBS

## 2023-12-22 DIAGNOSIS — R29.2: ICD-10-CM

## 2023-12-22 DIAGNOSIS — R29.898 RIGHT ARM WEAKNESS: ICD-10-CM

## 2023-12-22 DIAGNOSIS — M54.12 CERVICAL RADICULOPATHY: Primary | ICD-10-CM

## 2023-12-22 DIAGNOSIS — K51.819 OTHER ULCERATIVE COLITIS WITH COMPLICATION (MULTI): ICD-10-CM

## 2023-12-22 PROCEDURE — 3008F BODY MASS INDEX DOCD: CPT | Performed by: INTERNAL MEDICINE

## 2023-12-22 PROCEDURE — 1036F TOBACCO NON-USER: CPT | Performed by: INTERNAL MEDICINE

## 2023-12-22 PROCEDURE — 99214 OFFICE O/P EST MOD 30 MIN: CPT | Performed by: INTERNAL MEDICINE

## 2023-12-22 RX ORDER — OXYCODONE AND ACETAMINOPHEN 10; 325 MG/1; MG/1
1 TABLET ORAL EVERY 6 HOURS PRN
Qty: 28 TABLET | Refills: 0 | Status: SHIPPED | OUTPATIENT
Start: 2023-12-22 | End: 2023-12-29

## 2023-12-22 RX ORDER — GABAPENTIN 300 MG/1
300 CAPSULE ORAL 3 TIMES DAILY
Qty: 90 CAPSULE | Refills: 3 | Status: SHIPPED | OUTPATIENT
Start: 2023-12-22 | End: 2024-04-30 | Stop reason: SINTOL

## 2023-12-22 RX ORDER — IBUPROFEN 600 MG/1
600 TABLET ORAL EVERY 6 HOURS PRN
Qty: 90 TABLET | Refills: 0 | Status: SHIPPED | OUTPATIENT
Start: 2023-12-22 | End: 2024-12-21

## 2023-12-22 NOTE — TELEPHONE ENCOUNTER
Does he want to come in for appt so I can examine him and come up with plan  We have 130 appt (NB's 2nd  slot, he came early for us today) so can use that

## 2023-12-22 NOTE — PROGRESS NOTES
Chief Complaint   Patient presents with    Arm Pain     Pain started last , woke up with it and came out of nowhere  Pain in arm radiates down right   Pins and needles feeling also  Percocet not helpin pill as needed, no relief at all. Didnt try 2pills  No AE with it  Medrol didn't help at all either (day 5 now)  4-6th vertebrae is pinched or gap isnt what it should be  Saw chiro x 3 times total  Cant sleep due to the pain  Saw PT yesterday, said was C7 nerve  Feels weakness in the right arm,        ASSESSMENT/PLAN  Problem List Items Addressed This Visit          Medium    Ulcerative colitis (CMS/HCC)    Overview     Dx in   in clinical remission on Remicade and 6 MP 50 mg BID  Managed By GI, Dr Seals         Current Assessment & Plan     Careful with NSAIDS          Other Visit Diagnoses       Cervical radiculopathy    -  Primary    XRAY reviewed, narrowed disc space C6/7  Will get MRI given neurological signs  Add Gabapentin  Increase Opiate dose  NSAID after Medrol  SPine consult    Relevant Medications    oxyCODONE-acetaminophen (Percocet)  mg tablet    gabapentin (Neurontin) 300 mg capsule    ibuprofen 600 mg tablet    Other Relevant Orders    Referral to Physical Therapy    Referral to Medical Spine    Right arm weakness        Absence of reflex                  ALLERGIES  Patient has no known allergies.    MEDICATIONS  Current Outpatient Medications   Medication Instructions    ascorbic acid (VITAMIN C) 500 mg, oral, 2 times daily    cholecalciferol (VITAMIN D-3) 100 mcg, oral, Daily    cyanocobalamin (Vitamin B-12) 1,000 mcg tablet 1 tablet, oral, Daily    gabapentin (NEURONTIN) 300 mg, oral, 3 times daily    ibuprofen 600 mg, oral, Every 6 hours PRN    inFLIXimab (Remicade) 100 mg injection intravenous, Use as directed    Lactobacillus acidophilus (PROBIOTIC ORAL) 1 capsule, oral, Daily    mercaptopurine (Purinethol) 50 mg tablet 2 tablets, oral, Daily    methylPREDNISolone (Medrol  Dospak) 4 mg tablets Take as directed on package.    NON FORMULARY Daily, Wheat Grass    omega-3 fatty acids-fish oil (One-Per-Day Omega-3) 684-1,200 mg capsule 1 capsule, oral, Daily    oxyCODONE-acetaminophen (Percocet)  mg tablet 1 tablet, oral, Every 6 hours PRN    TURMERIC ORAL 3 capsules, oral, Daily, Turmeric Curcumin Oral Capsule        ROS otherwise negative aside from what was mentioned above in HPI.    PHYSICAL EXAM  /89   Pulse 53   Wt 120 kg (265 lb)   BMI 34.96 kg/m²   Body mass index is 34.96 kg/m².  Gen: Alert, NAD  HEENT:  PERRLA, EOMI, conjunctiva and sclera normal in appearance  Neck: Pain with lfexion and rotation of neck to the right   Respiratory:  Lungs CTAB  Cardiovascular:  Heart RRR. No M/R/G  Neuro:  Slightly decreased tricep strength, absent reflex  Skin:  No suspicious lesions present  Ext: RUE FROM

## 2023-12-22 NOTE — TELEPHONE ENCOUNTER
Patient calling back   Saw Chiro today   Pain is still bad and meds aren't helping  Hasn't slept in 3 days because pain in arm is so bad   Not sure what to do or if any other meds can be called in   Re-evaluate?    [phone]

## 2024-01-04 ENCOUNTER — ANCILLARY PROCEDURE (OUTPATIENT)
Dept: RADIOLOGY | Facility: CLINIC | Age: 54
End: 2024-01-04
Payer: COMMERCIAL

## 2024-01-04 DIAGNOSIS — M50.20 CERVICAL DISC HERNIATION: Primary | ICD-10-CM

## 2024-01-04 DIAGNOSIS — M54.12 CERVICAL RADICULOPATHY: ICD-10-CM

## 2024-01-04 PROBLEM — M50.90 CERVICAL DISC DISEASE: Status: ACTIVE | Noted: 2024-01-04

## 2024-01-04 PROCEDURE — 72141 MRI NECK SPINE W/O DYE: CPT | Performed by: RADIOLOGY

## 2024-01-04 PROCEDURE — 72141 MRI NECK SPINE W/O DYE: CPT

## 2024-01-06 ENCOUNTER — APPOINTMENT (OUTPATIENT)
Dept: RADIOLOGY | Facility: CLINIC | Age: 54
End: 2024-01-06
Payer: COMMERCIAL

## 2024-01-17 ENCOUNTER — HOSPITAL ENCOUNTER (EMERGENCY)
Age: 54
Discharge: HOME | End: 2024-01-17
Attending: EMERGENCY MEDICINE
Payer: COMMERCIAL

## 2024-01-17 ENCOUNTER — APPOINTMENT (OUTPATIENT)
Dept: CT IMAGING | Age: 54
End: 2024-01-17
Payer: COMMERCIAL

## 2024-01-17 ENCOUNTER — TELEPHONE (OUTPATIENT)
Dept: PRIMARY CARE | Facility: CLINIC | Age: 54
End: 2024-01-17
Payer: COMMERCIAL

## 2024-01-17 VITALS
BODY MASS INDEX: 32.87 KG/M2 | WEIGHT: 256 LBS | SYSTOLIC BLOOD PRESSURE: 137 MMHG | HEART RATE: 66 BPM | DIASTOLIC BLOOD PRESSURE: 78 MMHG | OXYGEN SATURATION: 94 % | TEMPERATURE: 97.2 F | RESPIRATION RATE: 18 BRPM

## 2024-01-17 DIAGNOSIS — J18.9 COMMUNITY ACQUIRED BILATERAL LOWER LOBE PNEUMONIA: Primary | ICD-10-CM

## 2024-01-17 DIAGNOSIS — J18.9 PNEUMONIA DUE TO INFECTIOUS ORGANISM, UNSPECIFIED LATERALITY, UNSPECIFIED PART OF LUNG: ICD-10-CM

## 2024-01-17 LAB
ALBUMIN SERPL-MCNC: 4 G/DL (ref 3.5–4.6)
ALP SERPL-CCNC: 65 U/L (ref 35–104)
ALT SERPL-CCNC: 18 U/L (ref 0–41)
ANION GAP SERPL CALCULATED.3IONS-SCNC: 9 MEQ/L (ref 9–15)
AST SERPL-CCNC: 19 U/L (ref 0–40)
B PARAP IS1001 DNA NPH QL NAA+NON-PROBE: NOT DETECTED
B PERT.PT PRMT NPH QL NAA+NON-PROBE: NOT DETECTED
BASOPHILS # BLD: 0 K/UL (ref 0–0.2)
BASOPHILS NFR BLD: 0.3 %
BILIRUB SERPL-MCNC: <0.2 MG/DL (ref 0.2–0.7)
BUN SERPL-MCNC: 20 MG/DL (ref 6–20)
C PNEUM DNA NPH QL NAA+NON-PROBE: NOT DETECTED
CALCIUM SERPL-MCNC: 9.1 MG/DL (ref 8.5–9.9)
CHLORIDE SERPL-SCNC: 100 MEQ/L (ref 95–107)
CO2 SERPL-SCNC: 31 MEQ/L (ref 20–31)
CREAT SERPL-MCNC: 0.98 MG/DL (ref 0.7–1.2)
EOSINOPHIL # BLD: 0.2 K/UL (ref 0–0.7)
EOSINOPHIL NFR BLD: 2.6 %
ERYTHROCYTE [DISTWIDTH] IN BLOOD BY AUTOMATED COUNT: 11.8 % (ref 11.5–14.5)
FLUAV RNA NPH QL NAA+NON-PROBE: NOT DETECTED
FLUBV RNA NPH QL NAA+NON-PROBE: NOT DETECTED
GLOBULIN SER CALC-MCNC: 3.6 G/DL (ref 2.3–3.5)
GLUCOSE SERPL-MCNC: 122 MG/DL (ref 70–99)
HADV DNA NPH QL NAA+NON-PROBE: NOT DETECTED
HCOV 229E RNA NPH QL NAA+NON-PROBE: NOT DETECTED
HCOV HKU1 RNA NPH QL NAA+NON-PROBE: NOT DETECTED
HCOV NL63 RNA NPH QL NAA+NON-PROBE: NOT DETECTED
HCOV OC43 RNA NPH QL NAA+NON-PROBE: NOT DETECTED
HCT VFR BLD AUTO: 45 % (ref 42–52)
HGB BLD-MCNC: 14.9 G/DL (ref 14–18)
HMPV RNA NPH QL NAA+NON-PROBE: NOT DETECTED
HPIV1 RNA NPH QL NAA+NON-PROBE: NOT DETECTED
HPIV2 RNA NPH QL NAA+NON-PROBE: NOT DETECTED
HPIV3 RNA NPH QL NAA+NON-PROBE: DETECTED
HPIV4 RNA NPH QL NAA+NON-PROBE: NOT DETECTED
LYMPHOCYTES # BLD: 3.6 K/UL (ref 1–4.8)
LYMPHOCYTES NFR BLD: 51.6 %
M PNEUMO DNA NPH QL NAA+NON-PROBE: NOT DETECTED
MCH RBC QN AUTO: 30.1 PG (ref 27–31.3)
MCHC RBC AUTO-ENTMCNC: 33.1 % (ref 33–37)
MCV RBC AUTO: 90.9 FL (ref 79–92.2)
MONOCYTES # BLD: 0.3 K/UL (ref 0.2–0.8)
MONOCYTES NFR BLD: 4.3 %
NEUTROPHILS # BLD: 2.8 K/UL (ref 1.4–6.5)
NEUTS SEG NFR BLD: 40.6 %
PERFORMED ON: NORMAL
PLATELET # BLD AUTO: 289 K/UL (ref 130–400)
POC CREATININE WHOLE BLOOD: 1.1
POC CREATININE: 1.1 MG/DL (ref 0.8–1.3)
POC SAMPLE TYPE: NORMAL
POTASSIUM SERPL-SCNC: 3.6 MEQ/L (ref 3.4–4.9)
PROT SERPL-MCNC: 7.6 G/DL (ref 6.3–8)
RBC # BLD AUTO: 4.95 M/UL (ref 4.7–6.1)
RSV RNA NPH QL NAA+NON-PROBE: NOT DETECTED
RV+EV RNA NPH QL NAA+NON-PROBE: NOT DETECTED
SARS-COV-2 RDRP RESP QL NAA+PROBE: NOT DETECTED
SARS-COV-2 RNA NPH QL NAA+NON-PROBE: NOT DETECTED
SODIUM SERPL-SCNC: 140 MEQ/L (ref 135–144)
TROPONIN, HIGH SENSITIVITY: 7 NG/L (ref 0–19)
WBC # BLD AUTO: 6.9 K/UL (ref 4.8–10.8)

## 2024-01-17 PROCEDURE — 96365 THER/PROPH/DIAG IV INF INIT: CPT

## 2024-01-17 PROCEDURE — 96361 HYDRATE IV INFUSION ADD-ON: CPT

## 2024-01-17 PROCEDURE — 71275 CT ANGIOGRAPHY CHEST: CPT

## 2024-01-17 PROCEDURE — 2580000003 HC RX 258: Performed by: EMERGENCY MEDICINE

## 2024-01-17 PROCEDURE — 6360000002 HC RX W HCPCS: Performed by: EMERGENCY MEDICINE

## 2024-01-17 PROCEDURE — 99285 EMERGENCY DEPT VISIT HI MDM: CPT

## 2024-01-17 PROCEDURE — 6360000004 HC RX CONTRAST MEDICATION: Performed by: EMERGENCY MEDICINE

## 2024-01-17 PROCEDURE — 80053 COMPREHEN METABOLIC PANEL: CPT

## 2024-01-17 PROCEDURE — 0202U NFCT DS 22 TRGT SARS-COV-2: CPT

## 2024-01-17 PROCEDURE — 87635 SARS-COV-2 COVID-19 AMP PRB: CPT

## 2024-01-17 PROCEDURE — 87040 BLOOD CULTURE FOR BACTERIA: CPT

## 2024-01-17 PROCEDURE — 85025 COMPLETE CBC W/AUTO DIFF WBC: CPT

## 2024-01-17 PROCEDURE — 84484 ASSAY OF TROPONIN QUANT: CPT

## 2024-01-17 PROCEDURE — 36415 COLL VENOUS BLD VENIPUNCTURE: CPT

## 2024-01-17 RX ORDER — 0.9 % SODIUM CHLORIDE 0.9 %
1000 INTRAVENOUS SOLUTION INTRAVENOUS ONCE
Status: COMPLETED | OUTPATIENT
Start: 2024-01-17 | End: 2024-01-17

## 2024-01-17 RX ORDER — AZITHROMYCIN 250 MG/1
TABLET, FILM COATED ORAL
Qty: 6 TABLET | Refills: 0 | Status: SHIPPED | OUTPATIENT
Start: 2024-01-17 | End: 2024-01-27

## 2024-01-17 RX ADMIN — CEFTRIAXONE SODIUM 1000 MG: 1 INJECTION, POWDER, FOR SOLUTION INTRAMUSCULAR; INTRAVENOUS at 03:16

## 2024-01-17 RX ADMIN — SODIUM CHLORIDE 1000 ML: 9 INJECTION, SOLUTION INTRAVENOUS at 00:51

## 2024-01-17 RX ADMIN — IOPAMIDOL 75 ML: 612 INJECTION, SOLUTION INTRAVENOUS at 02:16

## 2024-01-17 ASSESSMENT — ENCOUNTER SYMPTOMS
ABDOMINAL PAIN: 0
COUGH: 0
ABDOMINAL DISTENTION: 0
VOMITING: 0
WHEEZING: 0
EYE DISCHARGE: 0
SHORTNESS OF BREATH: 0
SORE THROAT: 0
PHOTOPHOBIA: 0
CHEST TIGHTNESS: 0

## 2024-01-17 ASSESSMENT — LIFESTYLE VARIABLES
HOW OFTEN DO YOU HAVE A DRINK CONTAINING ALCOHOL: NEVER
HOW MANY STANDARD DRINKS CONTAINING ALCOHOL DO YOU HAVE ON A TYPICAL DAY: PATIENT DOES NOT DRINK

## 2024-01-17 ASSESSMENT — PAIN - FUNCTIONAL ASSESSMENT: PAIN_FUNCTIONAL_ASSESSMENT: NONE - DENIES PAIN

## 2024-01-17 NOTE — TELEPHONE ENCOUNTER
----- Message from Michelle Michaud MD sent at 1/17/2024  8:10 AM EST -----  S/p ER visit yesterday  See how he is doing today  Can review in CE    Can ask him if he wants to try gabapentin at lower dose 100mg if was helping his cervical radiculopathy at all, I added it to allergy list already  (300mg caused LH/dizziness, was reason for ER visit), send if wishes.   Can take just at night at first, body often gets used to med after a few days and symptoms subside  Also would order repeat CT chest w/o contrast in 6 weeks given unusual finding of 'pneumonia' - read ER MD note  Can do ER f/up visit if wishes/need be  Diagnosis:  pneumonia

## 2024-01-17 NOTE — ED PROVIDER NOTES
dictations but occasionally words are mis-transcribed.)    Edinson Tam MD (electronically signed)  Attending Emergency Physician           Edinson Tam MD  01/17/24 [id number]     No assistance needed

## 2024-01-17 NOTE — TELEPHONE ENCOUNTER
Relayed to patient   Verbally understands.   Hesitant to take any gabapentin   Seeing Dr. Gaspar tomorrow about pinched nerve  Advised to repeat 6 week CT chest ,ordered

## 2024-01-17 NOTE — ED NOTES
The following labs were labeled with appropriate pt sticker and tubed to lab:     [] Blue     [] Lavender   [] on ice  [] Green/yellow  [] Green/black [] on ice  [] Grey  [] on ice  [] Yellow  [] Red  [] Pink  [] Type/ Screen  [] ABG  [] VBG    [x] COVID-19 swab    [x] Rapid  [] PCR  [x] Flu swab  [] Peds Viral Panel     [] Urine Sample  [] Fecal Sample  [] Pelvic Cultures  [x] Blood Cultures  [x] X 2  [] STREP Cultures  [] Wound Cultures

## 2024-01-18 ENCOUNTER — OFFICE VISIT (OUTPATIENT)
Dept: ORTHOPEDIC SURGERY | Facility: CLINIC | Age: 54
End: 2024-01-18
Payer: COMMERCIAL

## 2024-01-18 ENCOUNTER — ANCILLARY PROCEDURE (OUTPATIENT)
Dept: RADIOLOGY | Facility: CLINIC | Age: 54
End: 2024-01-18
Payer: COMMERCIAL

## 2024-01-18 VITALS — HEIGHT: 74 IN | BODY MASS INDEX: 34.01 KG/M2 | WEIGHT: 265 LBS

## 2024-01-18 DIAGNOSIS — M54.2 NECK PAIN: ICD-10-CM

## 2024-01-18 DIAGNOSIS — M54.12 CERVICAL RADICULOPATHY: Primary | ICD-10-CM

## 2024-01-18 LAB
BACTERIA BLD CULT ORG #2: NORMAL
BACTERIA BLD CULT: NORMAL

## 2024-01-18 PROCEDURE — 72050 X-RAY EXAM NECK SPINE 4/5VWS: CPT

## 2024-01-18 PROCEDURE — 99215 OFFICE O/P EST HI 40 MIN: CPT | Performed by: ORTHOPAEDIC SURGERY

## 2024-01-18 PROCEDURE — 99205 OFFICE O/P NEW HI 60 MIN: CPT | Performed by: ORTHOPAEDIC SURGERY

## 2024-01-18 PROCEDURE — 1036F TOBACCO NON-USER: CPT | Performed by: ORTHOPAEDIC SURGERY

## 2024-01-18 PROCEDURE — 72050 X-RAY EXAM NECK SPINE 4/5VWS: CPT | Performed by: RADIOLOGY

## 2024-01-18 PROCEDURE — 3008F BODY MASS INDEX DOCD: CPT | Performed by: ORTHOPAEDIC SURGERY

## 2024-01-18 NOTE — PROGRESS NOTES
Kenny Allen is a 53 y.o. male who presents for Pain of the Neck (Neck Pain, Rt Arm awoke w/ Pain ~1 mth ago - X-Rays Today, MRI done @ [facility] - Currently in PT w/ [facility] 2 Days Per Week - Prior Chiro for Tx as well).    HPI:  53-year-old gentleman here for evaluation of neck pain right arm pain.  Was sent by Dr. Michaud.  The note from December 22, 2023 was reviewed.  He denies any fever chills nausea vomiting night sweats.  He has no bowel or bladder complaints.    Physical exam:  Well-nourished, well kept.    Patient can rise from a seated position, can sit from a standing position. Can get up heels and toes.  Patient is mildly tender in the paraspinal musculature of the cervical spine is range of motion is mildly decreased secondary to some pain and stiffness no weakness no instability to muscle strength. examination of the upper extremities reveals no point tenderness, swelling, or deformity.  Range of motion of the shoulders, elbows, wrists, and fingers are full without crepitance, instability, or exacerbation of pain. Strength is 5/5 throughout. no redness, abrasions, or lesions on the upper extremities bilaterally.  Gross sensation intact to the extremities.  Deep tendon reflexes 2+ and symmetric bilaterally.  Arredondo negative.  Affect normal.  Alert and oriented X 3.  Coordination normal.    Imaging studies:  We ordered and reviewed AP lateral flexion-extension plain films of the cervical spine.  We also reviewed an MRI of the cervical spine from January 4, 2024.    Assessment:  New patient visit.  53-year-old gentleman here for neck pain and mostly right arm pain and numbness.  This started 3-1/2 weeks ago when he woke up.  No precipitating event no chronic issues.  Pain is in the neck and the right trapezius muscles, it goes down into the upper arm past the elbow into the forearm with some numbness occasionally out into the wrist and hand on the right side with no specific pattern.  Overall he  thinks the upper arm is what bothers him the most.  He has tried chiropractic care that did not give him much relief, he is now going to [facility] and has had several visits with them and it may be helping.  No history of epidurals or surgery on his neck.  This is a gentleman with an undiagnosed problem with uncertain prognosis    Plan:  For complete plan and/or surgical details, please refer to Dr. Gaspar's portion of this split dictation.    In a face-to-face encounter, I performed a history and physical examination, discussed pertinent diagnostic studies if indicated, and discussed diagnosis and management strategies with both the patient and the midlevel provider.  I reviewed the midlevel's note and agree with the documented findings and plan of care.    Right arm radiculopathy for the past 3 weeks.  Patient has pain, numbness, tingling and weakness in the right arm.  He notices most of the symptoms in his arm and past the elbow into the forearm.  The hand is involved sometimes.  He clearly notices clumsiness and weakness as he is right-handed.  On exam he has 4/5 strength of C6 on the right side otherwise he has 5/5 motor bilaterally.  I reviewed this MRI with Dr. Jones.  Were both concerned that there may be a larger herniated disc at C5-6 on the right than is clearly delineated on the axial images.  It looks more pronounced on the sagittal's.  He does, coincidentally, have quite a bit of stenosis on the left particularly at C6-7.  However, he is asymptomatic at C6-7.    Assessment/plan: C5-6 right radiculopathy.  Were going to get an EMG nerve conduction study.  He is already in physical therapy.  Will see him back in about a month.  This is only been going on 3 weeks.  He does have a severe threat to inhibition of bodily function especially given his weakness.  We did discuss and consider surgery on him but at this point he wants to hold off which I think is reasonable.  If in a month he  still having symptoms we could rescan him at a different scanner as this was recommended by Dr. Jones.

## 2024-01-20 LAB
EKG ATRIAL RATE: 55 BPM
EKG P AXIS: 41 DEGREES
EKG P-R INTERVAL: 186 MS
EKG Q-T INTERVAL: 478 MS
EKG QRS DURATION: 90 MS
EKG QTC CALCULATION (BAZETT): 457 MS
EKG R AXIS: 28 DEGREES
EKG T AXIS: 39 DEGREES
EKG VENTRICULAR RATE: 55 BPM

## 2024-01-22 LAB
BACTERIA BLD CULT ORG #2: NORMAL
BACTERIA BLD CULT: NORMAL

## 2024-04-05 ENCOUNTER — APPOINTMENT (OUTPATIENT)
Dept: PRIMARY CARE | Facility: CLINIC | Age: 54
End: 2024-04-05
Payer: COMMERCIAL

## 2024-04-30 ENCOUNTER — APPOINTMENT (OUTPATIENT)
Dept: PRIMARY CARE | Facility: CLINIC | Age: 54
End: 2024-04-30
Payer: COMMERCIAL

## 2024-04-30 ENCOUNTER — OFFICE VISIT (OUTPATIENT)
Dept: PRIMARY CARE | Facility: CLINIC | Age: 54
End: 2024-04-30
Payer: COMMERCIAL

## 2024-04-30 VITALS
SYSTOLIC BLOOD PRESSURE: 136 MMHG | BODY MASS INDEX: 36.05 KG/M2 | OXYGEN SATURATION: 96 % | HEIGHT: 73 IN | HEART RATE: 55 BPM | DIASTOLIC BLOOD PRESSURE: 74 MMHG | WEIGHT: 272 LBS

## 2024-04-30 DIAGNOSIS — K51.819 OTHER ULCERATIVE COLITIS WITH COMPLICATION (MULTI): ICD-10-CM

## 2024-04-30 DIAGNOSIS — E78.2 HYPERLIPIDEMIA, MIXED: ICD-10-CM

## 2024-04-30 DIAGNOSIS — E55.9 VITAMIN D DEFICIENCY: ICD-10-CM

## 2024-04-30 DIAGNOSIS — Z00.00 ROUTINE ADULT HEALTH MAINTENANCE: Primary | Chronic | ICD-10-CM

## 2024-04-30 DIAGNOSIS — R93.89 ABNORMAL CT OF THE CHEST: ICD-10-CM

## 2024-04-30 DIAGNOSIS — Z12.5 SCREENING FOR PROSTATE CANCER: ICD-10-CM

## 2024-04-30 PROCEDURE — 99396 PREV VISIT EST AGE 40-64: CPT | Performed by: INTERNAL MEDICINE

## 2024-04-30 PROCEDURE — 1036F TOBACCO NON-USER: CPT | Performed by: INTERNAL MEDICINE

## 2024-04-30 PROCEDURE — 90471 IMMUNIZATION ADMIN: CPT | Performed by: INTERNAL MEDICINE

## 2024-04-30 PROCEDURE — 90677 PCV20 VACCINE IM: CPT | Performed by: INTERNAL MEDICINE

## 2024-04-30 RX ORDER — MULTIVITAMIN
1 TABLET ORAL DAILY
Start: 2024-04-30 | End: 2025-04-30

## 2024-04-30 ASSESSMENT — PATIENT HEALTH QUESTIONNAIRE - PHQ9
1. LITTLE INTEREST OR PLEASURE IN DOING THINGS: NOT AT ALL
2. FEELING DOWN, DEPRESSED OR HOPELESS: NOT AT ALL
SUM OF ALL RESPONSES TO PHQ9 QUESTIONS 1 AND 2: 0

## 2024-04-30 NOTE — PROGRESS NOTES
ANNUAL CPE VISIT  Chief Complaint   Patient presents with    Annual Exam     Denies any concerns today      HPI: Cervical radiculopathy is better  Did PT, Acupuncture, Cupping, chiropractor  Gabapentin caused syncope (went to ER)  (Copied)  MDM  Patient had a rather unusual emergency room course.  He was found to be hypoxic initially requiring supplemental oxygen to bring his sats in the 94% plus range.  Because of this low pulse ox reading and the associated lightheaded feeling patient was screened for pulmonary embolism with a CT of the chest the CTA shows bilateral multifocal pneumonia.  However, patient had a normal white blood count at 6000 and he is afebrile.  He is not tachycardic.  His wife arrived and was at bedside and I discussed with the patient the recommendation to admit him for IV antibiotics and further treatment.  They do not want to stay in the hospital.  They essentially were going to sign out AGAINST MEDICAL ADVICE but we decided to do a screening pulse ox now that he is feeling better and try with ambulation.  He is maintained at 95% or better with out oxygen.  Rapid COVID was negative the patient did have COVID a couple years ago but no history of lung problems.  Respiratory panel pending at the point of disposition.  After a long discussion with the patient and his wife and his desire to go home.  He is willing to take a dose of IV Rocephin and will fill a prescription for Zithromax at home.  We have blood cultures pending and a respiratory panel pending.  He works outside to work and recommend that he stay off work and excuse was given for that.  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  Recommend that he return here for any worsening symptoms.  He is going to keep his with orthopedic appointment this morning with Dr. Gaspar.    CT chest done in ER 1/24:  Ill-defined opacities in  the right greater than left lungs, also bronchial   wall thickening on the right.  Findings suggest infectious/inflammatory   bronchopneumonia.  Recommend short-term follow-up imaging to ensure   resolution.     CT 12/21:  Extensive bilateral groundglass interstitial infiltrate without   interval improvement with persistent mild reactive hilar and mediastinal   adenopathy.     CT calcium score 3/23: visualized segments of the lungs are normally expanded. There is   scattered bands atelectasis/scarring bilaterally.     Labs reviewed:  Component  Ref Range & Units 2 wk ago   Protein, Total  6.3 - 8.0 g/dL 7.4   Albumin  3.9 - 4.9 g/dL 4.1   Calcium, Total  8.5 - 10.2 mg/dL 9.5   Bilirubin, Total  0.2 - 1.3 mg/dL 0.3   Alkaline Phosphatase  38 - 113 U/L 66   AST  14 - 40 U/L 22   ALT  10 - 54 U/L 25   Glucose  74 - 99 mg/dL 89     BUN  9 - 24 mg/dL 21   Creatinine  0.73 - 1.22 mg/dL 1.02   Sodium  136 - 144 mmol/L 138   Potassium  3.7 - 5.1 mmol/L 4.9   Chloride  97 - 105 mmol/L 102   CO2  22 - 30 mmol/L 25   Anion Gap  9 - 18 mmol/L 11   Estimated Glomerular Filtration Rate  >=60 mL/min/1.73m² 89     Component  Ref Range & Units 2 wk ago   WBC  3.70 - 11.00 k/uL 8.36   RBC  4.20 - 6.00 m/uL 5.11   Hemoglobin  13.0 - 17.0 g/dL 15.8   Hematocrit  39.0 - 51.0 % 46.4   MCV  80.0 - 100.0 fL 90.8   MCH  26.0 - 34.0 pg 30.9   MCHC  30.5 - 36.0 g/dL 34.1   RDW-CV  11.5 - 15.0 % 12.0   Platelet Count  150 - 400 k/uL 274   MPV  9.0 - 12.7 fL 9.0   Absolute nRBC  <0.01 k/uL <0.01     Assessment and Plan:  Problem List Items Addressed This Visit          High    Routine adult health maintenance - Primary (Chronic)    Overview     Declined COVID and FLU Vaccines  Cscope 2/17/21 (2yrs), 5/16/23 (2yrs)  TDAP 6/28/13, 9/16/17  5/10/23: PSA 0.93          Current Assessment & Plan     Annual Wellness exam completed   Preventive Health History reviewed  Ordered:   Labs    PCV 20           Relevant Medications    multivitamin tablet     "Other Relevant Orders    Lipid Panel    Urinalysis with Reflex Culture and Microscopic    Referral to Ophthalmology       Medium    Abnormal CT of the chest    Overview     CT chest done in ER 1/24:  Ill-defined opacities in the right greater than left lungs, also bronchial   wall thickening on the right.  Findings suggest infectious/inflammatory   bronchopneumonia.  Recommend short-term follow-up imaging to ensure   resolution.   CT 12/21:  Extensive bilateral groundglass interstitial infiltrate without   interval improvement with persistent mild reactive hilar and mediastinal   adenopathy.   CT calcium score 3/23: visualized segments of the lungs are normally expanded. There is   scattered bands atelectasis/scarring bilaterally.          Current Assessment & Plan     Will get repeat CT         Relevant Orders    CT chest wo IV contrast    Hyperlipidemia, mixed    Relevant Orders    TSH with reflex to Free T4 if abnormal    Lipid Panel    Screening for prostate cancer    Relevant Orders    Prostate Specific Antigen, Screen    Ulcerative colitis (Multi)    Overview     Dx in 2001  in clinical remission on Remicade and 6 MP 50 mg BID  Managed By GI, Dr Seals         Vitamin D deficiency    Overview     2/3/22: 28  on supp  Goal ~50         Relevant Orders    Vitamin D 25-Hydroxy,Total (for eval of Vitamin D levels)    Vitamin B12     ROS otherwise negative aside from what was mentioned above in HPI.    Vitals  /74   Pulse 55   Ht 1.848 m (6' 0.75\")   Wt 123 kg (272 lb)   SpO2 96%   BMI 36.13 kg/m²   Body mass index is 36.13 kg/m².  Physical Exam  Gen: Alert, NAD  HEENT:  Normal exam  Neck:  No masses/nodes palpable; Thyroid nontender and without nodules; No SHELLI  Respiratory:  Lungs CTAB  CV: RRR  Neuro:  Gross motor and sensory intact  Skin:  No suspicious lesions present  Breast: No masses or axillary lymphadenopathy  ELBERT: Prostate not enlarged. No prostate mass or nodule(s) palpated, brown stool. (Pt " declined chaperone)    Allergies and Medications  Gabapentin  Current Outpatient Medications   Medication Instructions    ascorbic acid (VITAMIN C) 500 mg, oral, 2 times daily    cholecalciferol (VITAMIN D-3) 100 mcg, oral, Daily    cyanocobalamin (Vitamin B-12) 1,000 mcg tablet 1 tablet, oral, Daily    ibuprofen 600 mg, oral, Every 6 hours PRN    inFLIXimab (Remicade) 100 mg injection intravenous, Use as directed    Lactobacillus acidophilus (PROBIOTIC ORAL) 1 capsule, oral, Daily    mercaptopurine (Purinethol) 50 mg tablet 2 tablets, oral, Daily    multivitamin tablet 1 tablet, oral, Daily    NON FORMULARY Daily, Wheat Grass    omega-3 fatty acids-fish oil (One-Per-Day Omega-3) 684-1,200 mg capsule 1 capsule, oral, Daily    TURMERIC ORAL 3 capsules, oral, Daily, Turmeric Curcumin Oral Capsule     Active Problem List  Patient Active Problem List   Diagnosis    Anemia    Grade I hemorrhoids    History of colon polyps    History of COVID-19    IFG (impaired fasting glucose)    Ulcerative colitis (Multi)    Vitamin D deficiency    Routine adult health maintenance    H/O herpes zoster    Hyperlipidemia, mixed    Class 2 severe obesity due to excess calories with serious comorbidity and body mass index (BMI) of 36.0 to 36.9 in adult (Multi)    Screening for prostate cancer    Abnormal screening cardiac CT    Cervical disc disease    Abnormal CT of the chest       Comprehensive Medical/Surgical/Social/Family History  Past Medical History:   Diagnosis Date    Abnormal screening cardiac CT     3/23: CT calcium score: 47 (LAD).  Annual Risk   0-99, 0.4%    H/O chest x-ray 01/14/2022    Some improvement in continued bilateral large subpleural infiltrates, left greater than right (COVID)    H/O chest x-ray 04/19/2022    No acute radiographic abnormality.    H/O chest x-ray 02/22/2022    Bilateral patchy hazy airspace opacities likely decreased within the left  hemithorax consistent with sequela of infectious/inflammatory  process  such as viral pneumonia.    H/O chest x-ray 12/25/2021    Increasing bilateral patchy hazy airspace opacities suggestive of  infectious/inflammatory process such as viral pneumonia.    H/O CT scan of chest 12/29/2021    No CT evidence of pulmonary embolism. 2. Extensive bilateral groundglass interstitial infiltrate without interval improvement with persistent mild reactive hilar and mediastinal adenopathy.    H/O CT scan of chest 01/17/2024    Ill-defined opacities in the right greater than left lungs, also bronchial wall thickening on the right.  Findings suggest infectious/inflammatory bronchopneumonia.    H/O echocardiogram 01/15/2022    The left ventricle is normal in size. Left ventricular systolic function is hyperdynamic. EF = 79 5% (2D biplane) Normal left ventricular diastolic function.  The right ventricle is normal in size. Right ventricular systolic function is normal.  Estimated right ventricular systolic pressure is not reported due to an insufficient tricuspid regurgitation signal.    H/O echocardiogram 01/15/2022    Pt 2. Estimated right atrial pressure is 3 mmHg based on IVC assessment. No significant valvular abnormalities.    H/O magnetic resonance imaging of cervical spine 01/04/2024    Left paracentral disc extrusion at C6-7 measuring up to 7 mm flattening of the left ventral aspect of the cord and moderately narrowing the left neural foramen, likely contacting the exiting left C7 nerve root. No associated cord edema or myelomalacia.    Additional mild degenerative changes of the cervical spine as detailed.     Past Surgical History:   Procedure Laterality Date    COLONOSCOPY      Pseudopolyps in the ascending colon. Resected and retrieved.  Two diminutive polyps in the sigmoid colon, removed with a jumbo cold forceps. Resected and retrieved.  Four diminutive polyps in the rectum, removed with a jumbo cold forceps. Resected and retrieved.  Decreased mucosa vascular pattern in the rectum  and in the sigmoid colon.  Internal hemorrhoids    COLONOSCOPY  2023    Pseudopolyps in the ascending colon.                         - Decreased mucosa vascular pattern in the entire                         examined colon.                         - Internal hemorrhoids.       Social History     Social History Narrative    Social History:    , 1 child    [employer]    Nonsmoker    Occasional ETOH    ---    Family History:    M:     F: Cancer (agent orange related?) ( age 60)    GM: CAD, PVD ()    GM:COPD, DM ( age 97)    GF: {Prostate CA ( age 67)    B: Asthma    :    :    :

## 2024-05-03 ENCOUNTER — LAB (OUTPATIENT)
Dept: LAB | Facility: LAB | Age: 54
End: 2024-05-03
Payer: COMMERCIAL

## 2024-05-03 DIAGNOSIS — R97.20 INCREASED PROSTATE SPECIFIC ANTIGEN (PSA) VELOCITY: Primary | ICD-10-CM

## 2024-05-03 DIAGNOSIS — E78.2 HYPERLIPIDEMIA, MIXED: ICD-10-CM

## 2024-05-03 DIAGNOSIS — Z12.5 SCREENING FOR PROSTATE CANCER: ICD-10-CM

## 2024-05-03 DIAGNOSIS — E55.9 VITAMIN D DEFICIENCY: ICD-10-CM

## 2024-05-03 DIAGNOSIS — Z00.00 ROUTINE ADULT HEALTH MAINTENANCE: ICD-10-CM

## 2024-05-03 LAB
25(OH)D3 SERPL-MCNC: 32 NG/ML (ref 30–100)
APPEARANCE UR: CLEAR
BILIRUB UR STRIP.AUTO-MCNC: NEGATIVE MG/DL
CHOLEST SERPL-MCNC: 221 MG/DL (ref 0–199)
CHOLESTEROL/HDL RATIO: 5.7
COLOR UR: YELLOW
GLUCOSE UR STRIP.AUTO-MCNC: NEGATIVE MG/DL
HDLC SERPL-MCNC: 38.7 MG/DL
HOLD SPECIMEN: NORMAL
KETONES UR STRIP.AUTO-MCNC: NEGATIVE MG/DL
LDLC SERPL CALC-MCNC: 150 MG/DL
LEUKOCYTE ESTERASE UR QL STRIP.AUTO: NEGATIVE
NITRITE UR QL STRIP.AUTO: NEGATIVE
NON HDL CHOLESTEROL: 182 MG/DL (ref 0–149)
PH UR STRIP.AUTO: 7 [PH]
PROT UR STRIP.AUTO-MCNC: NEGATIVE MG/DL
PSA SERPL-MCNC: 1.34 NG/ML
RBC # UR STRIP.AUTO: NEGATIVE /UL
SP GR UR STRIP.AUTO: 1.02
TRIGL SERPL-MCNC: 164 MG/DL (ref 0–149)
TSH SERPL-ACNC: 3.27 MIU/L (ref 0.44–3.98)
UROBILINOGEN UR STRIP.AUTO-MCNC: <2 MG/DL
VIT B12 SERPL-MCNC: 1816 PG/ML (ref 211–911)
VLDL: 33 MG/DL (ref 0–40)

## 2024-05-03 PROCEDURE — 36415 COLL VENOUS BLD VENIPUNCTURE: CPT

## 2024-05-03 PROCEDURE — 84153 ASSAY OF PSA TOTAL: CPT

## 2024-05-03 PROCEDURE — 82306 VITAMIN D 25 HYDROXY: CPT

## 2024-05-03 PROCEDURE — 84443 ASSAY THYROID STIM HORMONE: CPT

## 2024-05-03 PROCEDURE — 82607 VITAMIN B-12: CPT

## 2024-05-03 PROCEDURE — 81003 URINALYSIS AUTO W/O SCOPE: CPT

## 2024-05-03 PROCEDURE — 80061 LIPID PANEL: CPT

## 2024-12-19 ENCOUNTER — TELEPHONE (OUTPATIENT)
Dept: PRIMARY CARE | Facility: CLINIC | Age: 54
End: 2024-12-19
Payer: COMMERCIAL

## 2024-12-19 NOTE — TELEPHONE ENCOUNTER
Patient called again asking to speak to a nurse. Said he has possible pinched nerve in his neck and wanted to know what he could do for the pain?

## 2024-12-19 NOTE — TELEPHONE ENCOUNTER
Patient left VM stating he has severe neck pain- thinks it is a pinched nerve and is having spasms.   Patient would like to know whom he should see and/or if there is something he can do for the pain. He is having a very hard time sleeping.  Please advise if appointment is needed IO/VV

## 2024-12-20 ENCOUNTER — OFFICE VISIT (OUTPATIENT)
Dept: PRIMARY CARE | Facility: CLINIC | Age: 54
End: 2024-12-20
Payer: COMMERCIAL

## 2024-12-20 VITALS
HEART RATE: 59 BPM | SYSTOLIC BLOOD PRESSURE: 148 MMHG | TEMPERATURE: 98.3 F | DIASTOLIC BLOOD PRESSURE: 91 MMHG | BODY MASS INDEX: 37.27 KG/M2 | HEIGHT: 73 IN | WEIGHT: 281.2 LBS | OXYGEN SATURATION: 93 %

## 2024-12-20 DIAGNOSIS — M54.12 CERVICAL RADICULOPATHY: ICD-10-CM

## 2024-12-20 DIAGNOSIS — M50.90 CERVICAL DISC DISEASE: Primary | ICD-10-CM

## 2024-12-20 PROCEDURE — 99214 OFFICE O/P EST MOD 30 MIN: CPT | Performed by: NURSE PRACTITIONER

## 2024-12-20 PROCEDURE — 1036F TOBACCO NON-USER: CPT | Performed by: NURSE PRACTITIONER

## 2024-12-20 PROCEDURE — 3008F BODY MASS INDEX DOCD: CPT | Performed by: NURSE PRACTITIONER

## 2024-12-20 RX ORDER — CYCLOBENZAPRINE HCL 5 MG
TABLET ORAL
Qty: 60 TABLET | Refills: 0 | Status: SHIPPED | OUTPATIENT
Start: 2024-12-20

## 2024-12-20 RX ORDER — PREDNISONE 20 MG/1
40 TABLET ORAL DAILY
Qty: 10 TABLET | Refills: 0 | Status: SHIPPED | OUTPATIENT
Start: 2024-12-20 | End: 2024-12-25

## 2024-12-20 RX ORDER — OXYCODONE AND ACETAMINOPHEN 10; 325 MG/1; MG/1
1 TABLET ORAL EVERY 6 HOURS PRN
Qty: 28 TABLET | Refills: 0 | Status: SHIPPED | OUTPATIENT
Start: 2024-12-20 | End: 2024-12-27

## 2024-12-20 ASSESSMENT — PATIENT HEALTH QUESTIONNAIRE - PHQ9
SUM OF ALL RESPONSES TO PHQ9 QUESTIONS 1 AND 2: 0
1. LITTLE INTEREST OR PLEASURE IN DOING THINGS: NOT AT ALL
2. FEELING DOWN, DEPRESSED OR HOPELESS: NOT AT ALL

## 2024-12-20 NOTE — ASSESSMENT & PLAN NOTE
try steroid burst, salon pas, advil dual  also provided muscle relaxant, percocet given upcoming holiday  fu with medical spine  not interested in surgery

## 2024-12-20 NOTE — PROGRESS NOTES
Problem List Items Addressed This Visit          Medium    Cervical disc disease - Primary    Overview     1/24 MRI cervical spine: Left paracentral disc extrusion at C6-7 measuring up to 7 mm flattening of the left ventral aspect of the cord and moderately narrowing the left neural foramen, likely contacting the exiting left C7 nerve root. No associated cord edema or myelomalacia.  Additional mild degenerative changes of the cervical spine         Current Assessment & Plan     try steroid burst, salon pas, advil dual  also provided muscle relaxant, percocet given upcoming holiday  fu with medical spine  not interested in surgery          Relevant Medications    predniSONE (Deltasone) 20 mg tablet    cyclobenzaprine (Flexeril) 5 mg tablet    oxyCODONE-acetaminophen (Percocet)  mg tablet    Other Relevant Orders    Referral to Physical Medicine Rehab    Cervical radiculopathy    Relevant Medications    predniSONE (Deltasone) 20 mg tablet    cyclobenzaprine (Flexeril) 5 mg tablet    oxyCODONE-acetaminophen (Percocet)  mg tablet    Other Relevant Orders    Referral to Physical Medicine Rehab        Subjective   Patient ID: Kenny Allen is a 54 y.o. male who presents for pinched nerve (Pinched nerve in neck on the right side started 2-3 days ago and has gotten worse went to pt and they stretched his neck).  HPI  Pain is not radiating  R arm is not weak   Relieved by:  Exacerbated by: rotation c spine to R  Ice, heat, massaging  Didn't get sleep last night  1/18/24 XR cerv:  Mild degenerative changes of the cervical spine without instability.  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    === 01/04/24 ===    MR CERVICAL SPINE WO CONTRAST    - Impression -  Left paracentral disc extrusion at C6-7 measuring up to 7 mm  flattening of the left ventral aspect of the cord and moderately  narrowing the left neural foramen, likely contacting the exiting left  C7 nerve root. No associated cord edema or myelomalacia.    Additional  "mild degenerative changes of the cervical spine as  detailed.    12/22/23 Dr Michaud:  Cervical radiculopathy    -  Primary      XRAY reviewed, narrowed disc space C6/7  Will get MRI given neurological signs  Add Gabapentin  Increase Opiate dose  NSAID after Medrol  SPine consult     Relevant Medications     oxyCODONE-acetaminophen (Percocet)  mg tablet     gabapentin (Neurontin) 300 mg capsule     ibuprofen 600 mg tablet     Other Relevant Orders     Referral to Physical Therapy     Referral to Medical Spine     1/18/24 Dr Gaspar ortho copied:  Assessment/plan: C5-6 right radiculopathy. Were going to get an EMG nerve conduction study. He is already in physical therapy. Will see him back in about a month. This is only been going on 3 weeks. He does have a severe threat to inhibition of bodily function especially given his weakness. We did discuss and consider surgery on him but at this point he wants to hold off which I think is reasonable. If in a month he still having symptoms we could rescan him at a different scanner as this was recommended by Dr. Jones.     Review of Systems   All other systems reviewed and are negative.      BP Readings from Last 3 Encounters:   12/20/24 (!) 148/91   04/30/24 136/74   12/22/23 149/89      Wt Readings from Last 3 Encounters:   12/20/24 128 kg (281 lb 3.2 oz)   04/30/24 123 kg (272 lb)   01/18/24 120 kg (265 lb)      BMI:   Estimated body mass index is 37.36 kg/m² as calculated from the following:    Height as of this encounter: 1.848 m (6' 0.75\").    Weight as of this encounter: 128 kg (281 lb 3.2 oz).    Objective   Physical Exam  Constitutional:       General: He is not in acute distress.  HENT:      Head: Normocephalic and atraumatic.      Right Ear: Tympanic membrane and external ear normal.      Left Ear: Tympanic membrane and external ear normal.      Nose: Nose normal.      Mouth/Throat:      Mouth: Mucous membranes are moist.   Eyes:      Extraocular " Movements: Extraocular movements intact.      Conjunctiva/sclera: Conjunctivae normal.   Cardiovascular:      Rate and Rhythm: Normal rate and regular rhythm.      Pulses: Normal pulses.   Pulmonary:      Effort: Pulmonary effort is normal.      Breath sounds: Normal breath sounds.   Abdominal:      General: Bowel sounds are normal.      Palpations: Abdomen is soft.   Musculoskeletal:         General: Normal range of motion.      Cervical back: Normal range of motion and neck supple.      Comments: FROM. Over c-spine is non tender.  He is point painful over R shoulder    Skin:     General: Skin is warm and dry.   Neurological:      General: No focal deficit present.      Mental Status: He is alert.      Cranial Nerves: No cranial nerve deficit.      Sensory: No sensory deficit.      Motor: No weakness.      Gait: Gait normal.      Deep Tendon Reflexes: Reflexes normal.   Psychiatric:         Mood and Affect: Mood normal.

## 2024-12-30 ENCOUNTER — HOSPITAL ENCOUNTER (OUTPATIENT)
Dept: RADIOLOGY | Facility: CLINIC | Age: 54
Discharge: HOME | End: 2024-12-30
Payer: COMMERCIAL

## 2024-12-30 DIAGNOSIS — R93.89 ABNORMAL CT OF THE CHEST: ICD-10-CM

## 2024-12-30 PROCEDURE — 71250 CT THORAX DX C-: CPT | Performed by: RADIOLOGY

## 2024-12-30 PROCEDURE — 71250 CT THORAX DX C-: CPT

## 2025-01-04 PROBLEM — U09.9 POST COVID-19 CONDITION, UNSPECIFIED: Status: ACTIVE | Noted: 2024-04-30

## 2025-02-07 ENCOUNTER — TELEPHONE (OUTPATIENT)
Dept: PRIMARY CARE | Facility: CLINIC | Age: 55
End: 2025-02-07
Payer: COMMERCIAL

## 2025-02-07 ENCOUNTER — OFFICE VISIT (OUTPATIENT)
Dept: PRIMARY CARE | Facility: CLINIC | Age: 55
End: 2025-02-07
Payer: COMMERCIAL

## 2025-02-07 VITALS
HEART RATE: 57 BPM | WEIGHT: 274 LBS | TEMPERATURE: 97.9 F | OXYGEN SATURATION: 95 % | SYSTOLIC BLOOD PRESSURE: 125 MMHG | HEIGHT: 73 IN | DIASTOLIC BLOOD PRESSURE: 81 MMHG | BODY MASS INDEX: 36.31 KG/M2

## 2025-02-07 DIAGNOSIS — J06.9 UPPER RESPIRATORY TRACT INFECTION, UNSPECIFIED TYPE: Primary | ICD-10-CM

## 2025-02-07 PROCEDURE — 1036F TOBACCO NON-USER: CPT | Performed by: NURSE PRACTITIONER

## 2025-02-07 PROCEDURE — 3008F BODY MASS INDEX DOCD: CPT | Performed by: NURSE PRACTITIONER

## 2025-02-07 PROCEDURE — 99212 OFFICE O/P EST SF 10 MIN: CPT | Performed by: NURSE PRACTITIONER

## 2025-02-07 RX ORDER — FLUTICASONE PROPIONATE 50 MCG
1 SPRAY, SUSPENSION (ML) NASAL
COMMUNITY
Start: 2025-02-05 | End: 2025-02-12

## 2025-02-07 RX ORDER — GUAIFENESIN 600 MG/1
600 TABLET, EXTENDED RELEASE ORAL 2 TIMES DAILY
COMMUNITY
Start: 2025-02-05 | End: 2025-02-12

## 2025-02-07 RX ORDER — AMOXICILLIN AND CLAVULANATE POTASSIUM 875; 125 MG/1; MG/1
875 TABLET, FILM COATED ORAL 2 TIMES DAILY
Qty: 14 TABLET | Refills: 0 | Status: SHIPPED | OUTPATIENT
Start: 2025-02-07 | End: 2025-02-14

## 2025-02-07 ASSESSMENT — PATIENT HEALTH QUESTIONNAIRE - PHQ9
SUM OF ALL RESPONSES TO PHQ9 QUESTIONS 1 AND 2: 0
2. FEELING DOWN, DEPRESSED OR HOPELESS: NOT AT ALL
1. LITTLE INTEREST OR PLEASURE IN DOING THINGS: NOT AT ALL

## 2025-02-07 NOTE — PATIENT INSTRUCTIONS
Most upper respiratory tract infections (URIs) are the common cold, are caused by a virus and require no antibiotic treatment.  The misuse of antibiotics can have adverse outcomes and increase antibiotic resistance due to unnecessary use of antibiotics.  Medications for symptomatic relief include: antihistamines, oral or topical decongestants, saline nasal sprays, and analgesics to reduce fever, aches, and pain.     Therefore, would not start antibiotic until symptoms have been present WITHOUT ANY IMPROVEMENT for at least 7 days as more than likely the URI/sinusitis is viral in origin, will resolve on its own, and antibiotics won't have any effect. A typical course for a viral infection is worsening symptoms through day #5, then gradual improvement, sometimes over the next 7-10 days. In essence, it's a week getting sick and then a week getting better.

## 2025-02-07 NOTE — TELEPHONE ENCOUNTER
Patient left  stating he went to outpatient [facility] on Wednesday and is not feeling better.  Spoke to patient.  Patient stated he started feeling sick on Saturday, 2/1/25- body aches, fever, nasal congestion.  Patient went to [facility] clinic on Wednesday and was given Flonase, Mucinex and tessalon perls.  In the note he said he declined being tested for COVID, FLU, or RSV. He said they asked him if he wanted shots for these things not swabbed.  I told the patient at this point he needs to treat the sx.  Patient understood but wondered if there was something to help with the cough- it is a dry non productive cough but his stomach muscles are hurting from coughing so much.  Please advise

## 2025-02-07 NOTE — PROGRESS NOTES
"  Problem List Items Addressed This Visit    None  Visit Diagnoses       Upper respiratory tract infection, unspecified type    -  Primary    2/5/25 [facility] UC  I suspect flu  day 6 with some improvement  cont conservative mgt  augmentin if needed  prn fu io    Relevant Medications    amoxicillin-pot clavulanate (Augmentin) 875-125 mg tablet             Subjective   Patient ID: Kenny Allen is a 54 y.o. male who presents for Sick Visit (Fever, congestion, body aches, headaches ,cough /Day 5 of Sx's /Went to outpatient [facility] Wednesday- they documented refused swabs - pt declined shots  ).  HPI  2/5/25 [facility] uc    1. Viral illness  -declined covid/flu/RSV testing  - fluticasone (FLONASE ALLERGY RELIEF) 50 mcg/actuation nasal spray; Use 1 Spray in each nostril once daily for 7 days. Dispense: 9.9 mL; Refill: 0  - guaiFENesin (MUCINEX) 600 mg 12 hr tablet; Take 1 tablet by mouth two times a day for 7 days. Dispense: 14 tablet; Refill: 0  - benzonatate (TESSALON PERLE) 100 mg capsule; Take 1-2 capsules by mouth three times a day as needed for up to 7 days. Dispense: 30 capsule; Refill: 0     Last fever yesterday morning  Tmax 100.7  Motrin this morning  97.9 F now  Head congestion is better  Cough is a little better today  Dry cough  Using mucinex also  No sob or wheeze  No sore throat  Ears are popping    Review of Systems   All other systems reviewed and are negative.      BP Readings from Last 3 Encounters:   02/07/25 125/81   12/20/24 (!) 148/91   04/30/24 136/74      Wt Readings from Last 3 Encounters:   02/07/25 124 kg (274 lb)   12/20/24 128 kg (281 lb 3.2 oz)   04/30/24 123 kg (272 lb)      BMI:   Estimated body mass index is 36.15 kg/m² as calculated from the following:    Height as of this encounter: 1.854 m (6' 1\").    Weight as of this encounter: 124 kg (274 lb).    Objective   Physical Exam  Constitutional:       General: He is not in acute distress.  HENT:      Head: Normocephalic and atraumatic.      Right Ear: " Tympanic membrane and external ear normal.      Left Ear: Tympanic membrane and external ear normal.      Nose: Nose normal.      Mouth/Throat:      Mouth: Mucous membranes are moist.   Eyes:      Extraocular Movements: Extraocular movements intact.      Conjunctiva/sclera: Conjunctivae normal.   Neck:      Vascular: No carotid bruit.   Cardiovascular:      Rate and Rhythm: Normal rate and regular rhythm.      Pulses: Normal pulses.   Pulmonary:      Effort: Pulmonary effort is normal.      Breath sounds: Normal breath sounds.   Musculoskeletal:         General: Normal range of motion.      Cervical back: Normal range of motion and neck supple.   Lymphadenopathy:      Cervical: No cervical adenopathy.   Skin:     General: Skin is warm and dry.   Neurological:      General: No focal deficit present.      Mental Status: He is alert.   Psychiatric:         Mood and Affect: Mood normal.

## 2025-05-12 ENCOUNTER — APPOINTMENT (OUTPATIENT)
Dept: PRIMARY CARE | Facility: CLINIC | Age: 55
End: 2025-05-12
Payer: COMMERCIAL

## 2025-05-12 VITALS
WEIGHT: 277.2 LBS | DIASTOLIC BLOOD PRESSURE: 74 MMHG | HEART RATE: 58 BPM | HEIGHT: 73 IN | OXYGEN SATURATION: 96 % | BODY MASS INDEX: 36.74 KG/M2 | TEMPERATURE: 98.2 F | SYSTOLIC BLOOD PRESSURE: 116 MMHG

## 2025-05-12 DIAGNOSIS — Z00.00 ROUTINE ADULT HEALTH MAINTENANCE: Primary | Chronic | ICD-10-CM

## 2025-05-12 DIAGNOSIS — R03.0 BLOOD PRESSURE ELEVATED WITHOUT HISTORY OF HTN: ICD-10-CM

## 2025-05-12 DIAGNOSIS — T78.40XD ALLERGY, SUBSEQUENT ENCOUNTER: ICD-10-CM

## 2025-05-12 DIAGNOSIS — E66.812 CLASS 2 SEVERE OBESITY DUE TO EXCESS CALORIES WITH SERIOUS COMORBIDITY AND BODY MASS INDEX (BMI) OF 36.0 TO 36.9 IN ADULT: ICD-10-CM

## 2025-05-12 DIAGNOSIS — R73.01 IFG (IMPAIRED FASTING GLUCOSE): ICD-10-CM

## 2025-05-12 DIAGNOSIS — Z12.5 SCREENING FOR PROSTATE CANCER: ICD-10-CM

## 2025-05-12 DIAGNOSIS — E66.01 CLASS 2 SEVERE OBESITY DUE TO EXCESS CALORIES WITH SERIOUS COMORBIDITY AND BODY MASS INDEX (BMI) OF 36.0 TO 36.9 IN ADULT: ICD-10-CM

## 2025-05-12 DIAGNOSIS — R06.83 SNORING: ICD-10-CM

## 2025-05-12 DIAGNOSIS — Z86.19 H/O HERPES ZOSTER: ICD-10-CM

## 2025-05-12 DIAGNOSIS — E78.2 HYPERLIPIDEMIA, MIXED: ICD-10-CM

## 2025-05-12 DIAGNOSIS — E55.9 VITAMIN D DEFICIENCY: ICD-10-CM

## 2025-05-12 DIAGNOSIS — G47.19 EXCESSIVE DAYTIME SLEEPINESS: ICD-10-CM

## 2025-05-12 DIAGNOSIS — M50.90 CERVICAL DISC DISEASE: ICD-10-CM

## 2025-05-12 DIAGNOSIS — K51.919 CHRONIC ULCERATIVE COLITIS WITH COMPLICATION, UNSPECIFIED LOCATION (MULTI): ICD-10-CM

## 2025-05-12 DIAGNOSIS — R93.1 ABNORMAL SCREENING CARDIAC CT: ICD-10-CM

## 2025-05-12 PROBLEM — D64.9 ANEMIA: Status: RESOLVED | Noted: 2023-02-05 | Resolved: 2025-05-12

## 2025-05-12 PROBLEM — T78.40XA ALLERGIES: Status: ACTIVE | Noted: 2025-05-12

## 2025-05-12 PROBLEM — K51.90 ULCERATIVE COLITIS: Status: RESOLVED | Noted: 2023-02-05 | Resolved: 2025-05-12

## 2025-05-12 PROBLEM — M54.12 CERVICAL RADICULOPATHY: Status: RESOLVED | Noted: 2024-12-20 | Resolved: 2025-05-12

## 2025-05-12 PROCEDURE — 99396 PREV VISIT EST AGE 40-64: CPT | Performed by: INTERNAL MEDICINE

## 2025-05-12 PROCEDURE — 1036F TOBACCO NON-USER: CPT | Performed by: INTERNAL MEDICINE

## 2025-05-12 PROCEDURE — 3008F BODY MASS INDEX DOCD: CPT | Performed by: INTERNAL MEDICINE

## 2025-05-12 RX ORDER — FLUTICASONE PROPIONATE 50 MCG
1 SPRAY, SUSPENSION (ML) NASAL
Qty: 16 G | Refills: 3 | Status: SHIPPED | OUTPATIENT
Start: 2025-05-12 | End: 2025-05-19

## 2025-05-12 RX ORDER — DEXAMETHASONE 1 MG/1
1 TABLET ORAL ONCE
Qty: 1 TABLET | Refills: 0 | Status: SHIPPED | OUTPATIENT
Start: 2025-05-12 | End: 2025-05-12

## 2025-05-12 NOTE — PROGRESS NOTES
ANNUAL CPE VISIT  Chief Complaint   Patient presents with    Annual Exam     HPI: Reviewed chart/medical care received since last seen by me.    CT chets 1/25: IMPRESSION:  1.  Similar appearance of diffuse reticular and ground-glass airspace  opacities visualized throughout the bilateral lungs as compared with  prior exam dated 03/31/2023, likely reflecting fibrotic like changes  related to the patient's prior history of COVID pneumonia.  2. Unchanged small hiatal hernia.  3. Mild coronary artery calcifications.    C/o snoring  Wakes family up  Tired when awakens  Tired in daytime, dragging at times    Assessment and Plan:  Problem List Items Addressed This Visit          High    Routine adult health maintenance - Primary (Chronic)    Overview   Declined COVID and FLU Vaccines  Cscope 2/17/21 (2yrs), 5/16/23 (2yrs)  TDAP 6/28/13, 9/16/17  PCV 20 4/30/24  5/10/23: PSA 0.93, 5/3/24: 1.34         Current Assessment & Plan   Annual Wellness exam completed   Preventive Health History reviewed  Ordered:   Labs    Cscope   Shingrix discussed           Relevant Orders    Comprehensive Metabolic Panel    CBC and Auto Differential    Lipid Panel    Urinalysis with Reflex Microscopic       Medium    Abnormal screening cardiac CT    Overview   3/23: CT calcium score: 47 (LAD).  Annual Risk   0-99, 0.4%  1/25 CT Chest:  Mild coronary artery calcifications         Allergies    Relevant Medications    fluticasone (Flonase) 50 mcg/actuation nasal spray    Cervical disc disease    Overview   1/24 MRI cervical spine: Left paracentral disc extrusion at C6-7 measuring up to 7 mm flattening of the left ventral aspect of the cord and moderately narrowing the left neural foramen, likely contacting the exiting left C7 nerve root. No associated cord edema or myelomalacia.  Additional mild degenerative changes of the cervical spine         Class 2 severe obesity due to excess calories with serious comorbidity and body mass index (BMI) of  "36.0 to 36.9 in adult    Overview   No family hx MEN/MTC  No personal pancreatitis  Discussed IF  Will change whole milk to 2%  Cut back on butter         Current Assessment & Plan   Weill get DMST and PSG         Relevant Medications    dexAMETHasone (Decadron) 1 mg tablet    Other Relevant Orders    Cortisol    Dexamethasone    In-Center Sleep Study    Colitis, chronic, ulcerative (Multi)    Overview   Dx in 2001  in clinical remission on Remicade and 6 MP 50 mg BID  Managed By GI, Dr Seals         Current Assessment & Plan   Cscope this year         H/O herpes zoster    Overview   V1 dermatome         Hyperlipidemia, mixed    Overview   Diet changes         Relevant Orders    TSH with reflex to Free T4 if abnormal    IFG (impaired fasting glucose)    Overview   Diet controlled  Hba1c 5.9% in 2/22         Relevant Orders    Albumin-Creatinine Ratio, Urine Random    Screening for prostate cancer    Relevant Orders    Prostate Specific Antigen, Screen    Vitamin D deficiency    Overview   2/3/22: 28  on supp  Goal ~50         Relevant Orders    Vitamin D 25-Hydroxy,Total (for eval of Vitamin D levels)    Vitamin B12     Other Visit Diagnoses         Excessive daytime sleepiness        Relevant Orders    In-Center Sleep Study      Snoring        Will get PSG  If (+) for FARRAH will try getting Zepbound covered    Relevant Orders    In-Center Sleep Study      Blood pressure elevated without history of HTN        Relevant Orders    In-Center Sleep Study          ROS otherwise negative aside from what was mentioned above in HPI.    Vitals  /74   Pulse 58   Temp 36.8 °C (98.2 °F)   Ht 1.854 m (6' 1\")   Wt 126 kg (277 lb 3.2 oz)   SpO2 96%   BMI 36.57 kg/m²   Body mass index is 36.57 kg/m².  Physical Exam  Gen: Alert, NAD  HEENT:  Normal exam  Neck:  No masses/nodes palpable; Thyroid nontender and without nodules; No SHELLI  Respiratory:  Lungs CTAB  CV: RRR  Neuro:  Gross motor and sensory intact  Skin:  No " suspicious lesions present  Breast: No masses or axillary lymphadenopathy  ELBERT: Prostate not enlarged. No prostate mass or nodule(s) palpated, brown stool. (Pt declined chaperone)      Allergies and Medications  Gabapentin  Current Outpatient Medications   Medication Instructions    ascorbic acid (VITAMIN C) 500 mg, 2 times daily    cholecalciferol (VITAMIN D-3) 100 mcg, oral, Daily    cyanocobalamin (Vitamin B-12) 1,000 mcg tablet 1 tablet, Daily    dexAMETHasone (DECADRON) 1 mg, oral, Once, Take at 11pm the night before the cortisol lab test    fluticasone (Flonase) 50 mcg/actuation nasal spray 1 spray, Each Nostril, Daily RT    inFLIXimab (Remicade) 100 mg injection Infuse into a venous catheter. Use as directed    Lactobacillus acidophilus (PROBIOTIC ORAL) 1 capsule, Daily    mercaptopurine (Purinethol) 50 mg tablet 2 tablets, Daily    NON FORMULARY Daily    omega-3 fatty acids-fish oil (One-Per-Day Omega-3) 684-1,200 mg capsule 1 capsule, Daily       Active Problem List  Problem List[1]    Comprehensive Medical/Surgical/Social/Family History  Medical History[2]  Surgical History[3]    Social History     Social History Narrative    Social History:    , 1 child    [facility] [facility]    Nonsmoker    Occasional ETOH    ---    Family History:    M: Osteopenia, OA    F:  CAD?, Cancer (agent orange related?) ( age 60)    PGM: CAD, PVD ()    MGM:COPD, DM ( age 97)    MGF: {Prostate CA ( age 67)    B: Asthma    :    :    :          [1]   Patient Active Problem List  Diagnosis    Grade I hemorrhoids    History of colon polyps    History of COVID-19    IFG (impaired fasting glucose)    Colitis, chronic, ulcerative (Multi)    Vitamin D deficiency    Routine adult health maintenance    H/O herpes zoster    Hyperlipidemia, mixed    Class 2 severe obesity due to excess calories with serious comorbidity and body mass index (BMI) of 36.0 to 36.9 in adult    Screening for prostate cancer     Abnormal screening cardiac CT    Cervical disc disease    Post covid-19 condition, unspecified    Allergies   [2]   Past Medical History:  Diagnosis Date    Abnormal screening cardiac CT     3/23: CT calcium score: 47 (LAD).  Annual Risk   0-99, 0.4%    H/O chest x-ray 01/14/2022    Some improvement in continued bilateral large subpleural infiltrates, left greater than right (COVID)    H/O chest x-ray 04/19/2022    No acute radiographic abnormality.    H/O chest x-ray 02/22/2022    Bilateral patchy hazy airspace opacities likely decreased within the left  hemithorax consistent with sequela of infectious/inflammatory process  such as viral pneumonia.    H/O chest x-ray 12/25/2021    Increasing bilateral patchy hazy airspace opacities suggestive of  infectious/inflammatory process such as viral pneumonia.    H/O CT scan of chest 12/29/2021    No CT evidence of pulmonary embolism. 2. Extensive bilateral groundglass interstitial infiltrate without interval improvement with persistent mild reactive hilar and mediastinal adenopathy.    H/O CT scan of chest 01/17/2024    Ill-defined opacities in the right greater than left lungs, also bronchial wall thickening on the right.  Findings suggest infectious/inflammatory bronchopneumonia.    H/O CT scan of chest 01/04/2025    1.  Similar appearance of diffuse reticular and ground-glass airspace opacities visualized throughout the bilateral lungs as compared with prior exam dated 03/31/2023, likely reflecting fibrotic like changes related to the patient's prior history of COVID pneumonia. 2. Unchanged small hiatal hernia. 3. Mild coronary artery calcifications    H/O echocardiogram 01/15/2022    The left ventricle is normal in size. Left ventricular systolic function is hyperdynamic. EF = 79 5% (2D biplane) Normal left ventricular diastolic function.  The right ventricle is normal in size. Right ventricular systolic function is normal.  Estimated right ventricular systolic  pressure is not reported due to an insufficient tricuspid regurgitation signal.    H/O echocardiogram 01/15/2022    Pt 2. Estimated right atrial pressure is 3 mmHg based on IVC assessment. No significant valvular abnormalities.    H/O magnetic resonance imaging of cervical spine 01/04/2024    Left paracentral disc extrusion at C6-7 measuring up to 7 mm flattening of the left ventral aspect of the cord and moderately narrowing the left neural foramen, likely contacting the exiting left C7 nerve root. No associated cord edema or myelomalacia.    Additional mild degenerative changes of the cervical spine as detailed.   [3]   Past Surgical History:  Procedure Laterality Date    COLONOSCOPY      Pseudopolyps in the ascending colon. Resected and retrieved.  Two diminutive polyps in the sigmoid colon, removed with a jumbo cold forceps. Resected and retrieved.  Four diminutive polyps in the rectum, removed with a jumbo cold forceps. Resected and retrieved.  Decreased mucosa vascular pattern in the rectum and in the sigmoid colon.  Internal hemorrhoids    COLONOSCOPY  05/16/2023    Pseudopolyps in the ascending colon.                         - Decreased mucosa vascular pattern in the entire                         examined colon.                         - Internal hemorrhoids.

## 2025-05-23 LAB
25(OH)D3+25(OH)D2 SERPL-MCNC: 44 NG/ML (ref 30–100)
ALBUMIN SERPL-MCNC: 4.2 G/DL (ref 3.6–5.1)
ALBUMIN/CREAT UR: NORMAL
ALP SERPL-CCNC: 61 U/L (ref 35–144)
ALT SERPL-CCNC: 18 U/L (ref 9–46)
ANION GAP SERPL CALCULATED.4IONS-SCNC: 11 MMOL/L (CALC) (ref 7–17)
APPEARANCE UR: CLEAR
AST SERPL-CCNC: 16 U/L (ref 10–35)
BASOPHILS # BLD AUTO: 27 CELLS/UL (ref 0–200)
BASOPHILS NFR BLD AUTO: 0.4 %
BILIRUB SERPL-MCNC: 0.4 MG/DL (ref 0.2–1.2)
BILIRUB UR QL STRIP: NEGATIVE
BUN SERPL-MCNC: 16 MG/DL (ref 7–25)
CALCIUM SERPL-MCNC: 9.1 MG/DL (ref 8.6–10.3)
CHLORIDE SERPL-SCNC: 106 MMOL/L (ref 98–110)
CHOLEST SERPL-MCNC: 204 MG/DL
CHOLEST/HDLC SERPL: 4.9 (CALC)
CO2 SERPL-SCNC: 21 MMOL/L (ref 20–32)
COLOR UR: YELLOW
CORTIS SERPL-MCNC: 0.8 MCG/DL
CREAT SERPL-MCNC: 0.86 MG/DL (ref 0.7–1.3)
CREAT UR-MCNC: NORMAL MG/DL
DEXAMETHASONE SERPL-MCNC: NORMAL NG/DL
EGFRCR SERPLBLD CKD-EPI 2021: 104 ML/MIN/1.73M2
EOSINOPHIL # BLD AUTO: 150 CELLS/UL (ref 15–500)
EOSINOPHIL NFR BLD AUTO: 2.2 %
ERYTHROCYTE [DISTWIDTH] IN BLOOD BY AUTOMATED COUNT: 12.8 % (ref 11–15)
GLUCOSE SERPL-MCNC: 119 MG/DL (ref 65–99)
GLUCOSE UR QL STRIP: NEGATIVE
HCT VFR BLD AUTO: 47.5 % (ref 38.5–50)
HDLC SERPL-MCNC: 42 MG/DL
HGB BLD-MCNC: 16.1 G/DL (ref 13.2–17.1)
HGB UR QL STRIP: NEGATIVE
KETONES UR QL STRIP: NEGATIVE
LDLC SERPL CALC-MCNC: 140 MG/DL (CALC)
LEUKOCYTE ESTERASE UR QL STRIP: NEGATIVE
LYMPHOCYTES # BLD AUTO: 1952 CELLS/UL (ref 850–3900)
LYMPHOCYTES NFR BLD AUTO: 28.7 %
MCH RBC QN AUTO: 30.4 PG (ref 27–33)
MCHC RBC AUTO-ENTMCNC: 33.9 G/DL (ref 32–36)
MCV RBC AUTO: 89.6 FL (ref 80–100)
MICROALBUMIN UR-MCNC: NORMAL
MONOCYTES # BLD AUTO: 360 CELLS/UL (ref 200–950)
MONOCYTES NFR BLD AUTO: 5.3 %
NEUTROPHILS # BLD AUTO: 4311 CELLS/UL (ref 1500–7800)
NEUTROPHILS NFR BLD AUTO: 63.4 %
NITRITE UR QL STRIP: NEGATIVE
NONHDLC SERPL-MCNC: 162 MG/DL (CALC)
PH UR STRIP: 5.5 [PH] (ref 5–8)
PLATELET # BLD AUTO: 309 THOUSAND/UL (ref 140–400)
PMV BLD REES-ECKER: 9.2 FL (ref 7.5–12.5)
POTASSIUM SERPL-SCNC: 4.7 MMOL/L (ref 3.5–5.3)
PROT SERPL-MCNC: 7.7 G/DL (ref 6.1–8.1)
PROT UR QL STRIP: NEGATIVE
PSA SERPL-MCNC: 1.09 NG/ML
RBC # BLD AUTO: 5.3 MILLION/UL (ref 4.2–5.8)
SODIUM SERPL-SCNC: 138 MMOL/L (ref 135–146)
SP GR UR STRIP: 1.02 (ref 1–1.03)
TRIGL SERPL-MCNC: 104 MG/DL
TSH SERPL-ACNC: 2.18 MIU/L (ref 0.4–4.5)
VIT B12 SERPL-MCNC: 1901 PG/ML (ref 200–1100)
WBC # BLD AUTO: 6.8 THOUSAND/UL (ref 3.8–10.8)

## 2025-06-01 LAB
25(OH)D3+25(OH)D2 SERPL-MCNC: 44 NG/ML (ref 30–100)
ALBUMIN SERPL-MCNC: 4.2 G/DL (ref 3.6–5.1)
ALBUMIN/CREAT UR: 2 MG/G CREAT
ALP SERPL-CCNC: 61 U/L (ref 35–144)
ALT SERPL-CCNC: 18 U/L (ref 9–46)
ANION GAP SERPL CALCULATED.4IONS-SCNC: 11 MMOL/L (CALC) (ref 7–17)
APPEARANCE UR: CLEAR
AST SERPL-CCNC: 16 U/L (ref 10–35)
BASOPHILS # BLD AUTO: 27 CELLS/UL (ref 0–200)
BASOPHILS NFR BLD AUTO: 0.4 %
BILIRUB SERPL-MCNC: 0.4 MG/DL (ref 0.2–1.2)
BILIRUB UR QL STRIP: NEGATIVE
BUN SERPL-MCNC: 16 MG/DL (ref 7–25)
CALCIUM SERPL-MCNC: 9.1 MG/DL (ref 8.6–10.3)
CHLORIDE SERPL-SCNC: 106 MMOL/L (ref 98–110)
CHOLEST SERPL-MCNC: 204 MG/DL
CHOLEST/HDLC SERPL: 4.9 (CALC)
CO2 SERPL-SCNC: 21 MMOL/L (ref 20–32)
COLOR UR: YELLOW
CORTIS SERPL-MCNC: 0.8 MCG/DL
CREAT SERPL-MCNC: 0.86 MG/DL (ref 0.7–1.3)
CREAT UR-MCNC: 119 MG/DL (ref 20–320)
DEXAMETHASONE SERPL-MCNC: 322 NG/DL
EGFRCR SERPLBLD CKD-EPI 2021: 104 ML/MIN/1.73M2
EOSINOPHIL # BLD AUTO: 150 CELLS/UL (ref 15–500)
EOSINOPHIL NFR BLD AUTO: 2.2 %
ERYTHROCYTE [DISTWIDTH] IN BLOOD BY AUTOMATED COUNT: 12.8 % (ref 11–15)
GLUCOSE SERPL-MCNC: 119 MG/DL (ref 65–99)
GLUCOSE UR QL STRIP: NEGATIVE
HCT VFR BLD AUTO: 47.5 % (ref 38.5–50)
HDLC SERPL-MCNC: 42 MG/DL
HGB BLD-MCNC: 16.1 G/DL (ref 13.2–17.1)
HGB UR QL STRIP: NEGATIVE
KETONES UR QL STRIP: NEGATIVE
LDLC SERPL CALC-MCNC: 140 MG/DL (CALC)
LEUKOCYTE ESTERASE UR QL STRIP: NEGATIVE
LYMPHOCYTES # BLD AUTO: 1952 CELLS/UL (ref 850–3900)
LYMPHOCYTES NFR BLD AUTO: 28.7 %
MCH RBC QN AUTO: 30.4 PG (ref 27–33)
MCHC RBC AUTO-ENTMCNC: 33.9 G/DL (ref 32–36)
MCV RBC AUTO: 89.6 FL (ref 80–100)
MICROALBUMIN UR-MCNC: 0.2 MG/DL
MONOCYTES # BLD AUTO: 360 CELLS/UL (ref 200–950)
MONOCYTES NFR BLD AUTO: 5.3 %
NEUTROPHILS # BLD AUTO: 4311 CELLS/UL (ref 1500–7800)
NEUTROPHILS NFR BLD AUTO: 63.4 %
NITRITE UR QL STRIP: NEGATIVE
NONHDLC SERPL-MCNC: 162 MG/DL (CALC)
PH UR STRIP: 5.5 [PH] (ref 5–8)
PLATELET # BLD AUTO: 309 THOUSAND/UL (ref 140–400)
PMV BLD REES-ECKER: 9.2 FL (ref 7.5–12.5)
POTASSIUM SERPL-SCNC: 4.7 MMOL/L (ref 3.5–5.3)
PROT SERPL-MCNC: 7.7 G/DL (ref 6.1–8.1)
PROT UR QL STRIP: NEGATIVE
PSA SERPL-MCNC: 1.09 NG/ML
RBC # BLD AUTO: 5.3 MILLION/UL (ref 4.2–5.8)
SODIUM SERPL-SCNC: 138 MMOL/L (ref 135–146)
SP GR UR STRIP: 1.02 (ref 1–1.03)
TRIGL SERPL-MCNC: 104 MG/DL
TSH SERPL-ACNC: 2.18 MIU/L (ref 0.4–4.5)
VIT B12 SERPL-MCNC: 1901 PG/ML (ref 200–1100)
WBC # BLD AUTO: 6.8 THOUSAND/UL (ref 3.8–10.8)

## 2025-07-17 ENCOUNTER — CLINICAL SUPPORT (OUTPATIENT)
Dept: SLEEP MEDICINE | Facility: CLINIC | Age: 55
End: 2025-07-17
Payer: COMMERCIAL

## 2025-07-17 VITALS — BODY MASS INDEX: 36.82 KG/M2 | WEIGHT: 277.78 LBS | HEIGHT: 73 IN

## 2025-07-17 DIAGNOSIS — E66.01 CLASS 2 SEVERE OBESITY DUE TO EXCESS CALORIES WITH SERIOUS COMORBIDITY AND BODY MASS INDEX (BMI) OF 36.0 TO 36.9 IN ADULT: ICD-10-CM

## 2025-07-17 DIAGNOSIS — R03.0 BLOOD PRESSURE ELEVATED WITHOUT HISTORY OF HTN: ICD-10-CM

## 2025-07-17 DIAGNOSIS — R06.83 SNORING: ICD-10-CM

## 2025-07-17 DIAGNOSIS — G47.19 EXCESSIVE DAYTIME SLEEPINESS: ICD-10-CM

## 2025-07-17 DIAGNOSIS — E66.812 CLASS 2 SEVERE OBESITY DUE TO EXCESS CALORIES WITH SERIOUS COMORBIDITY AND BODY MASS INDEX (BMI) OF 36.0 TO 36.9 IN ADULT: ICD-10-CM

## 2025-07-17 NOTE — PROGRESS NOTES
[facility] TECH NOTE:     Patient: Kenny Allen   MRN//AGE: [id number]  1970  54 y.o.   Technologist: Yury Zambrano   Room: 1   Service Date: 2025        Sleep Testing Location: [facility]    [place]: 5    TECHNOLOGIST SLEEP STUDY PROCEDURE NOTE:   This sleep study is being conducted according to the policies and procedures outlined by the [employer] accreditation standards.  The sleep study procedure and processes involved during this appointment was explained to the patient/patient’s family, questions were answered. The patient/family verbalized understanding.      The patient is a 54 y.o. year old male scheduled for a Diagnostic PSG Split night with montage of: PSG. he arrived for his appointment.      The study that was ultimately completed was a Diagnostic PSG Split night with montage of: PSG w/ C-FLOW.    The full study Was completed.  Patient questionnaires completed?: yes     Consents signed? yes    Initial Fall Risk Screening:     Kenny has not fallen in the last 6 months. Kenny does not have a fear of falling. He does not need assistance with sitting, standing, or walking. he does not need assistance walking in his home. he does not need assistance in an unfamiliar setting. The patient is notusing an assistive device.     Brief Study observations: Pt presents as 55 y/o Male here for scheduled PSG/ Split.  Noted no override orders on record.   Study Observations:  Pt arrived on time, was alone, was ambulatory w/o assistance, and appeared alert/ oriented throughout interactions w/ sleep staff.  Throughout intervention window noted 4% AHI > 30, accompanied by frequent arousals, frequent awakenings, frequent leg movements, mild occasional snoring, periodic WASO, and fragmented sleep architecture.  Noted no abnormal behaviors throughout duration of study.  Study Interventions:  Per Split Night protocol, administered CPAP therapy due to 4% AHI > 15 observed during intervention window w/ at least  120 mins of recorded sleep.  Initiated titration w/ Resmed Airfit P30-i Nasal Pillows Mask @ 6cmH2O of CPAP per protocol for elevated BMI.  Completed Titration w/ Resmed Airfit P30-i Nasal Pillows Mask @ 07cxP2V of CPAP.  Pressure changes were motivated by observed flow limitation, & frequent arousals.  Additional Notes:  None Noted.     Deviation to order/protocol and reason: N/A      If PAP, which was preferred mask/pressure/mode: Resmed Airfit P30-i Nasal Pillows Mask @ 84fhT4V of CPAP      Other:None    After the procedure, the patient/family was informed to ensure followup with ordering clinician for testing results.      Technologist: Yury Zambrano

## 2025-07-18 ASSESSMENT — SLEEP AND FATIGUE QUESTIONNAIRES
ESS-CHAD TOTAL SCORE: 5
HOW LIKELY ARE YOU TO NOD OFF OR FALL ASLEEP WHILE SITTING AND TALKING TO SOMEONE: WOULD NEVER DOZE
HOW LIKELY ARE YOU TO NOD OFF OR FALL ASLEEP IN A CAR, WHILE STOPPED FOR A FEW MINUTES IN TRAFFIC: WOULD NEVER DOZE
HOW LIKELY ARE YOU TO NOD OFF OR FALL ASLEEP WHILE LYING DOWN TO REST IN THE AFTERNOON WHEN CIRCUMSTANCES PERMIT: WOULD NEVER DOZE
HOW LIKELY ARE YOU TO NOD OFF OR FALL ASLEEP WHEN YOU ARE A PASSENGER IN A CAR FOR AN HOUR WITHOUT A BREAK: WOULD NEVER DOZE
SITING INACTIVE IN A PUBLIC PLACE LIKE A CLASS ROOM OR A MOVIE THEATER: SLIGHT CHANCE OF DOZING
HOW LIKELY ARE YOU TO NOD OFF OR FALL ASLEEP WHILE SITTING QUIETLY AFTER LUNCH WITHOUT ALCOHOL: WOULD NEVER DOZE
HOW LIKELY ARE YOU TO NOD OFF OR FALL ASLEEP WHILE WATCHING TV: MODERATE CHANCE OF DOZING
HOW LIKELY ARE YOU TO NOD OFF OR FALL ASLEEP WHILE SITTING AND READING: MODERATE CHANCE OF DOZING

## 2025-07-21 DIAGNOSIS — G47.33 OSA (OBSTRUCTIVE SLEEP APNEA): ICD-10-CM

## 2026-06-02 ENCOUNTER — APPOINTMENT (OUTPATIENT)
Dept: PRIMARY CARE | Facility: CLINIC | Age: 56
End: 2026-06-02
Payer: COMMERCIAL